# Patient Record
Sex: MALE | URBAN - METROPOLITAN AREA
[De-identification: names, ages, dates, MRNs, and addresses within clinical notes are randomized per-mention and may not be internally consistent; named-entity substitution may affect disease eponyms.]

---

## 2017-08-03 ENCOUNTER — RECORDS - HEALTHEAST (OUTPATIENT)
Dept: LAB | Facility: HOSPITAL | Age: 22
End: 2017-08-03

## 2017-08-04 LAB — HBV SURFACE AB SERPL IA-ACNC: NEGATIVE M[IU]/ML

## 2018-07-24 ENCOUNTER — COMMUNICATION - HEALTHEAST (OUTPATIENT)
Dept: TELEHEALTH | Facility: CLINIC | Age: 23
End: 2018-07-24

## 2018-07-24 ENCOUNTER — OFFICE VISIT - HEALTHEAST (OUTPATIENT)
Dept: FAMILY MEDICINE | Facility: CLINIC | Age: 23
End: 2018-07-24

## 2018-07-24 DIAGNOSIS — R05.9 COUGH: ICD-10-CM

## 2018-07-24 RX ORDER — ALBUTEROL SULFATE 90 UG/1
2 AEROSOL, METERED RESPIRATORY (INHALATION) EVERY 6 HOURS PRN
Qty: 1 EACH | Refills: 0 | Status: SHIPPED | OUTPATIENT
Start: 2018-07-24 | End: 2022-08-30

## 2018-07-24 RX ORDER — AZITHROMYCIN 250 MG/1
TABLET, FILM COATED ORAL
Qty: 6 TABLET | Refills: 0 | Status: SHIPPED | OUTPATIENT
Start: 2018-07-24 | End: 2021-09-26

## 2018-07-24 RX ORDER — BENZONATATE 200 MG/1
200 CAPSULE ORAL 3 TIMES DAILY PRN
Qty: 30 CAPSULE | Refills: 0 | Status: SHIPPED | OUTPATIENT
Start: 2018-07-24 | End: 2021-09-26

## 2021-06-01 VITALS — WEIGHT: 185 LBS

## 2021-06-26 NOTE — PROGRESS NOTES
Progress Notes by Fer Galvan PA-C at 7/24/2018 11:40 AM     Author: Fer Galvan PA-C Service: -- Author Type: Physician Assistant    Filed: 7/24/2018 12:27 PM Encounter Date: 7/24/2018 Status: Signed    : Fer Galvan PA-C (Physician Assistant)       Subjective:      Patient ID: Sudhir Mueller is a 23 y.o. male.    Chief Complaint:    HPI  Sudhir Mueller is a 23 y.o. male who presents today complaining of cold-like symptoms to include headache head congestion rhinorrhea dry nonproductive cough and congestion.  He has had also fatigue.  He is now developed sore throat odynophagia.  Has not had fever chills or night sweats but his temp in the office is 99.5 without antipyretic.  He denies vomiting diarrhea skin rash or urinary symptoms.    He works in housekeeping at [employer].  He is requesting a work note since he has been ill and does not want to return to work while he is sick.     No past medical history on file.    No past surgical history on file.    No family history on file.    Social History   Substance Use Topics   ? Smoking status: Never Smoker   ? Smokeless tobacco: Never Used   ? Alcohol use None       Review of Systems  As above in HPI, otherwise negative.    Objective:     /84 (Patient Site: Right Arm, Patient Position: Sitting, Cuff Size: Adult Regular)  Pulse 90  Temp 99  F (37.2  C) (Oral)   Resp 20  Wt 185 lb (83.9 kg)  SpO2 97%    Physical Exam  General: Patient is resting comfortably no acute distress is afebrile  HEENT: Head is normocephalic atraumatic eyes are PERRL EOMI sclera anicteric TMs are clear bilaterally  Throat is with mild pharyngeal wall erythema and tonsillar hypertrophy tonsils are 3+ uvula still midline posterior pharyngeal walls with heavy postnasal drainage  He has positive cervical lymphadenopathy and tenderness to palpation  Lungs: Clear to auscultation bilaterally  Heart: Regular rate and rhythm  Skin: Without rash  non-diaphoretic      Assessment:     Procedures    The encounter diagnosis was Cough.    Plan:     1. Cough  azithromycin (ZITHROMAX Z-JESSIE) 250 MG tablet    albuterol (PROAIR HFA;PROVENTIL HFA;VENTOLIN HFA) 90 mcg/actuation inhaler    benzonatate (TESSALON) 200 MG capsule         Patient Instructions     You were seen today for acute bronchitis. This is likely due to a viral illness.    Symptom management:  - Get plenty of rest  - Avoid smoking and second hand smoke  - May take tylenol or ibuprofen for fever/discomfort  - Drink plenty of non-caffeinated fluids  - Use nasal steroid spray for sinus congestion  - Albuterol inhaler may be used every 6 hours as needed for chest tightness      Reasons to be seen in the emergency room:  - Develop a fever of 100.4 or higher  - Cough changes, coughing up blood, or become short of breath  - Neck stiffness  - Chest pain  - Severe headache  - Unable to tolerate eating or drinking fluids    Otherwise, if no symptom improvement after 5 days, follow-up with your primary care provider.    As a result of our visit today, here are the action plans for you:    1. Medication(s) to stop: There are no discontinued medications.    2. Medication(s) to start or change:   Medications Ordered   Medications   ? albuterol (PROAIR HFA;PROVENTIL HFA;VENTOLIN HFA) 90 mcg/actuation inhaler     Sig: Inhale 2 puffs every 6 (six) hours as needed for wheezing. Use with spacer.     Dispense:  1 each     Refill:  0     May substitute the equivalent medication per insurance preference.   ? azithromycin (ZITHROMAX Z-JESSIE) 250 MG tablet     Sig: Take 2 tablets (500 mg) on  Day 1,  followed by 1 tablet (250 mg) once daily on Days 2 through 5.     Dispense:  6 tablet     Refill:  0   ? benzonatate (TESSALON) 200 MG capsule     Sig: Take 1 capsule (200 mg total) by mouth 3 (three) times a day as needed.     Dispense:  30 capsule     Refill:  0       3. Other instructions: Yes         Bronchitis, Antibiotic  Treatment (Adult)    Bronchitis is an infection of the air passages (bronchial tubes) in your lungs. It often occurs when you have a cold. This illness is contagious during the first few days and is spread through the air by coughing and sneezing, or by direct contact (touching the sick person and then touching your own eyes, nose, or mouth).  Symptoms of bronchitis include cough with mucus (phlegm) and low-grade fever. Bronchitis usually lasts 7 to 14 days. Mild cases can be treated with simple home remedies. More severe infection is treated with an antibiotic.  Home care  Follow these guidelines when caring for yourself at home:    If your symptoms are severe, rest at home for the first 2 to 3 days. When you go back to your usual activities, don't let yourself get too tired.    Do not smoke. Also avoid being exposed to secondhand smoke.    You may use over-the-counter medicines to control fever or pain, unless another medicine was prescribed. If you have chronic liver or kidney disease or have ever had a stomach ulcer or gastrointestinal bleeding, talk with your healthcare provider before using these medicines. Also talk to your provider if you are taking medicine to prevent blood clots. Aspirin should never be given to anyone younger than 18 years of age who is ill with a viral infection or fever. It may cause severe liver or brain damage.    Your appetite may be poor, so a light diet is fine. Avoid dehydration by drinking 6 to 8 glasses of fluids per day (such as water, soft drinks, sports drinks, juices, tea, or soup). Extra fluids will help loosen secretions in the nose and lungs.    Over-the-counter cough, cold, and sore-throat medicines will not shorten the length of the illness, but they may be helpful to reduce symptoms. (Note: Do not use decongestants if you have high blood pressure.)    Finish all antibiotic medicine. Do this even if you are feeling better after only a few days.  Follow-up care  Follow  up with your healthcare provider, or as advised. If you had an X-ray or ECG (electrocardiogram), a specialist will review it. You will be notified of any new findings that may affect your care.  If you are age 65 or older, or if you have a chronic lung disease or condition that affects your immune system, or you smoke, ask your healthcare provider about getting a pneumococcal vaccine and a yearly flu shot (influenza vaccine).  When to seek medical advice  Call your healthcare provider right away if any of these occur:    Fever of 100.4 F (38 C) or higher, or as directed by your healthcare provider    Coughing up increased amounts of colored sputum    Weakness, drowsiness, headache, facial pain, ear pain, or a stiff neck  Call 911  Call 911 if any of these occur.    Coughing up blood    Worsening weakness, drowsiness, headache, or stiff neck    Trouble breathing, wheezing, or pain with breathing  Date Last Reviewed: 9/13/2015 2000-2017 [employer]. [address] [place] [address], [place] [address]. All rights reserved. This information is not intended as a substitute for professional medical care. Always follow your healthcare professional's instructions.

## 2021-09-23 ENCOUNTER — OFFICE VISIT (OUTPATIENT)
Dept: FAMILY MEDICINE | Facility: CLINIC | Age: 26
End: 2021-09-23
Payer: COMMERCIAL

## 2021-09-23 VITALS
HEIGHT: 62 IN | SYSTOLIC BLOOD PRESSURE: 131 MMHG | BODY MASS INDEX: 45.27 KG/M2 | TEMPERATURE: 98.4 F | DIASTOLIC BLOOD PRESSURE: 86 MMHG | WEIGHT: 246 LBS | RESPIRATION RATE: 24 BRPM | HEART RATE: 96 BPM | OXYGEN SATURATION: 100 %

## 2021-09-23 DIAGNOSIS — Z71.85 VACCINE COUNSELING: Primary | ICD-10-CM

## 2021-09-23 DIAGNOSIS — R63.5 WEIGHT GAIN: ICD-10-CM

## 2021-09-23 DIAGNOSIS — Z00.00 ROUTINE GENERAL MEDICAL EXAMINATION AT A HEALTH CARE FACILITY: ICD-10-CM

## 2021-09-23 DIAGNOSIS — Z23 HIGH PRIORITY FOR 2019-NCOV VACCINE: ICD-10-CM

## 2021-09-23 DIAGNOSIS — E11.9 TYPE 2 DIABETES MELLITUS WITHOUT COMPLICATION, WITHOUT LONG-TERM CURRENT USE OF INSULIN (H): ICD-10-CM

## 2021-09-23 DIAGNOSIS — F39 MOOD DISORDER (H): ICD-10-CM

## 2021-09-23 DIAGNOSIS — E78.5 DYSLIPIDEMIA: ICD-10-CM

## 2021-09-23 DIAGNOSIS — R35.1 NOCTURIA: ICD-10-CM

## 2021-09-23 DIAGNOSIS — G44.209 TENSION HEADACHE: ICD-10-CM

## 2021-09-23 DIAGNOSIS — G47.9 SLEEP DISORDER: ICD-10-CM

## 2021-09-23 DIAGNOSIS — E66.813 CLASS 3 SEVERE OBESITY WITH SERIOUS COMORBIDITY AND BODY MASS INDEX (BMI) OF 45.0 TO 49.9 IN ADULT, UNSPECIFIED OBESITY TYPE (H): ICD-10-CM

## 2021-09-23 DIAGNOSIS — E66.01 CLASS 3 SEVERE OBESITY WITH SERIOUS COMORBIDITY AND BODY MASS INDEX (BMI) OF 45.0 TO 49.9 IN ADULT, UNSPECIFIED OBESITY TYPE (H): ICD-10-CM

## 2021-09-23 DIAGNOSIS — Z23 NEED FOR PROPHYLACTIC VACCINATION AND INOCULATION AGAINST INFLUENZA: ICD-10-CM

## 2021-09-23 LAB
CHOLEST SERPL-MCNC: 288 MG/DL
FASTING STATUS PATIENT QL REPORTED: YES
HBA1C MFR BLD: 11.7 % (ref 0–5.6)
HDLC SERPL-MCNC: 46 MG/DL
LDLC SERPL CALC-MCNC: 204 MG/DL
TRIGL SERPL-MCNC: 190 MG/DL

## 2021-09-23 PROCEDURE — 80061 LIPID PANEL: CPT | Performed by: STUDENT IN AN ORGANIZED HEALTH CARE EDUCATION/TRAINING PROGRAM

## 2021-09-23 PROCEDURE — 90686 IIV4 VACC NO PRSV 0.5 ML IM: CPT | Performed by: STUDENT IN AN ORGANIZED HEALTH CARE EDUCATION/TRAINING PROGRAM

## 2021-09-23 PROCEDURE — 99385 PREV VISIT NEW AGE 18-39: CPT | Mod: 25 | Performed by: STUDENT IN AN ORGANIZED HEALTH CARE EDUCATION/TRAINING PROGRAM

## 2021-09-23 PROCEDURE — 90651 9VHPV VACCINE 2/3 DOSE IM: CPT | Performed by: STUDENT IN AN ORGANIZED HEALTH CARE EDUCATION/TRAINING PROGRAM

## 2021-09-23 PROCEDURE — 90472 IMMUNIZATION ADMIN EACH ADD: CPT | Performed by: STUDENT IN AN ORGANIZED HEALTH CARE EDUCATION/TRAINING PROGRAM

## 2021-09-23 PROCEDURE — 0002A COVID-19,PF,PFIZER (12+ YRS): CPT | Performed by: STUDENT IN AN ORGANIZED HEALTH CARE EDUCATION/TRAINING PROGRAM

## 2021-09-23 PROCEDURE — 90471 IMMUNIZATION ADMIN: CPT | Performed by: STUDENT IN AN ORGANIZED HEALTH CARE EDUCATION/TRAINING PROGRAM

## 2021-09-23 PROCEDURE — 83036 HEMOGLOBIN GLYCOSYLATED A1C: CPT | Performed by: STUDENT IN AN ORGANIZED HEALTH CARE EDUCATION/TRAINING PROGRAM

## 2021-09-23 PROCEDURE — 91300 COVID-19,PF,PFIZER (12+ YRS): CPT | Performed by: STUDENT IN AN ORGANIZED HEALTH CARE EDUCATION/TRAINING PROGRAM

## 2021-09-23 PROCEDURE — 90715 TDAP VACCINE 7 YRS/> IM: CPT | Performed by: STUDENT IN AN ORGANIZED HEALTH CARE EDUCATION/TRAINING PROGRAM

## 2021-09-23 PROCEDURE — 36415 COLL VENOUS BLD VENIPUNCTURE: CPT | Performed by: STUDENT IN AN ORGANIZED HEALTH CARE EDUCATION/TRAINING PROGRAM

## 2021-09-23 ASSESSMENT — PATIENT HEALTH QUESTIONNAIRE - PHQ9: SUM OF ALL RESPONSES TO PHQ QUESTIONS 1-9: 16

## 2021-09-23 ASSESSMENT — MIFFLIN-ST. JEOR: SCORE: 1972.72

## 2021-09-26 PROBLEM — E11.9 TYPE 2 DIABETES MELLITUS WITHOUT COMPLICATION, WITHOUT LONG-TERM CURRENT USE OF INSULIN (H): Status: ACTIVE | Noted: 2021-09-26

## 2021-09-26 PROBLEM — E78.5 DYSLIPIDEMIA: Status: ACTIVE | Noted: 2021-09-26

## 2021-09-26 PROBLEM — F39 MOOD DISORDER (H): Status: ACTIVE | Noted: 2021-09-26

## 2021-09-26 PROBLEM — E66.813 CLASS 3 SEVERE OBESITY WITH SERIOUS COMORBIDITY AND BODY MASS INDEX (BMI) OF 45.0 TO 49.9 IN ADULT, UNSPECIFIED OBESITY TYPE (H): Status: ACTIVE | Noted: 2021-09-23

## 2021-09-26 PROBLEM — G44.209 TENSION HEADACHE: Status: ACTIVE | Noted: 2021-09-26

## 2021-09-26 PROBLEM — R63.5 WEIGHT GAIN: Status: ACTIVE | Noted: 2021-09-26

## 2021-09-26 RX ORDER — IBUPROFEN 800 MG/1
800 TABLET, FILM COATED ORAL
COMMUNITY
Start: 2021-04-22 | End: 2021-11-23

## 2021-09-26 NOTE — RESULT ENCOUNTER NOTE
Team,     I called Sudhir multiple times to discuss test results.   Would you please call him to find a good time for me to call him?   I prefer to discuss these results with him.     Thanks.     Briefly, for my/our records (please don't discuss this with him over the phone):   - Lipids poor control -  (goal <70). Likely related to weight. Rec: weight loss/diet/lifestyle changes.   - A1c 11.7, consistent with uncontrolled diabetes. Likely cause of nocturia. Lifestyle recommendations as outlined. Warrants repeat visit to discuss diagnosis and other screening (microalbumin, etc). Warrants pharm management beyond lifestyle - recommend metformin + GLP1 vs insulin (favor GLP for weight loss effect).     Dino Edmond DO   [facility] Resident   Pager#[phone]

## 2021-09-28 NOTE — PROGRESS NOTES
I have personally reviewed the history and examination as documented by Dr. Edmond.  I was present during key portions of the visit and agree with the assessment and plan as documented for 26 yr old male w/ morbid obesity here for wellness visit. Eval concerning for sleep apnea. Referral for sleep study. Check labwork for nocturia, obesity. Age-appropriate health maintenance and anticipatory guidance given.     Aashish Almazan MD  September 28, 2021  10:18 AM

## 2021-09-29 ENCOUNTER — TELEPHONE (OUTPATIENT)
Dept: FAMILY MEDICINE | Facility: CLINIC | Age: 26
End: 2021-09-29

## 2021-09-29 NOTE — TELEPHONE ENCOUNTER
Pt called back in after receiving missed call from us. I informed him that  would like to speak to him directly in regards to his lab results. Pt stated  can give him a call tomorrow 9/30 any time from 11am-3pm. End of call

## 2021-09-30 NOTE — TELEPHONE ENCOUNTER
Pt called in this morning to advise he has some free time now to talk to  but I informed him  will be occupied at the hospital. Pt stated he is free from 2PM-3PM today. Otherwise, he has free time tomorrow Fri 10/1 before 11am. I told him I will let  and his team know. End of call.

## 2021-10-06 NOTE — TELEPHONE ENCOUNTER
Pt called back because he never received a call from Dr Edmond. He wanted to check in and know what the results are.  It has been a week since he last called.

## 2021-10-08 ENCOUNTER — DOCUMENTATION ONLY (OUTPATIENT)
Dept: FAMILY MEDICINE | Facility: CLINIC | Age: 26
End: 2021-10-08

## 2021-10-08 NOTE — PROGRESS NOTES
10/08/21    Finally connected with Sudhir regarding lab results and new diagnosis of diabetes. Briefly explained A1c, and T1 vs T2. Discussed we do not know for sure which one he has but my suspicion is T2 given his weight (and not presenting in DKA, etc) - though he is fairly young and could have T1 from that perspective.     Discussed need to get him back into clinic and he will call this morning to set up an appointment. I offered to have staff call him but he preferred to call clinic himself now.     Discussed sx to be aware of and warning signs to call clinic / present to ED.   Discussed initial lifestyle changes. He will try to cut down on pop, energy drinks, sugar. Will try to exercise a bit more - I discussed 150 min of mod int exer per week.   Briefly mentioned medications but he did not want to discuss this in detail - will focus at next visit.     Dino Edmond DO   [facility] Resident   Pager#[phone]

## 2021-11-08 LAB — RETINOPATHY: NORMAL

## 2021-11-09 ENCOUNTER — OFFICE VISIT (OUTPATIENT)
Dept: FAMILY MEDICINE | Facility: CLINIC | Age: 26
End: 2021-11-09
Payer: COMMERCIAL

## 2021-11-09 VITALS
TEMPERATURE: 98.3 F | HEART RATE: 101 BPM | WEIGHT: 250.75 LBS | OXYGEN SATURATION: 96 % | BODY MASS INDEX: 46.56 KG/M2 | RESPIRATION RATE: 20 BRPM | SYSTOLIC BLOOD PRESSURE: 142 MMHG | DIASTOLIC BLOOD PRESSURE: 93 MMHG

## 2021-11-09 DIAGNOSIS — E78.5 DYSLIPIDEMIA ASSOCIATED WITH TYPE 2 DIABETES MELLITUS (H): ICD-10-CM

## 2021-11-09 DIAGNOSIS — G47.9 SLEEP DISORDER: ICD-10-CM

## 2021-11-09 DIAGNOSIS — E66.813 CLASS 3 SEVERE OBESITY WITH SERIOUS COMORBIDITY AND BODY MASS INDEX (BMI) OF 45.0 TO 49.9 IN ADULT, UNSPECIFIED OBESITY TYPE (H): ICD-10-CM

## 2021-11-09 DIAGNOSIS — R03.0 ELEVATED BLOOD PRESSURE READING WITHOUT DIAGNOSIS OF HYPERTENSION: ICD-10-CM

## 2021-11-09 DIAGNOSIS — G44.209 TENSION HEADACHE: ICD-10-CM

## 2021-11-09 DIAGNOSIS — E66.01 CLASS 3 SEVERE OBESITY WITH SERIOUS COMORBIDITY AND BODY MASS INDEX (BMI) OF 45.0 TO 49.9 IN ADULT, UNSPECIFIED OBESITY TYPE (H): ICD-10-CM

## 2021-11-09 DIAGNOSIS — E11.9 TYPE 2 DIABETES MELLITUS WITHOUT COMPLICATION, WITHOUT LONG-TERM CURRENT USE OF INSULIN (H): Primary | ICD-10-CM

## 2021-11-09 DIAGNOSIS — E11.69 DYSLIPIDEMIA ASSOCIATED WITH TYPE 2 DIABETES MELLITUS (H): ICD-10-CM

## 2021-11-09 LAB
ALBUMIN SERPL-MCNC: 3.7 G/DL (ref 3.4–5)
ALP SERPL-CCNC: 66 U/L (ref 40–150)
ALT SERPL W P-5'-P-CCNC: 278 U/L
ANION GAP SERPL CALCULATED.3IONS-SCNC: 15 MMOL/L (ref 3–14)
AST SERPL W P-5'-P-CCNC: 100 U/L (ref 0–45)
BILIRUB SERPL-MCNC: 0.9 MG/DL (ref 0.2–1.3)
BUN SERPL-MCNC: 10 MG/DL (ref 7–30)
CALCIUM SERPL-MCNC: 9.7 MG/DL (ref 8.5–10.1)
CHLORIDE BLD-SCNC: 98 MMOL/L
CO2 SERPL-SCNC: 28 MMOL/L (ref 20–32)
CREAT SERPL-MCNC: 0.6 MG/DL
CREAT UR-MCNC: 175 MG/DL
GFR SERPL CREATININE-BSD FRML MDRD: >90 ML/MIN/1.73M2
GLUCOSE BLD-MCNC: 222 MG/DL (ref 70–99)
MICROALBUMIN UR-MCNC: 84.74 MG/DL (ref 0–1.99)
MICROALBUMIN/CREAT UR: 484.2 MG/G CR
POTASSIUM BLD-SCNC: 4.2 MMOL/L (ref 3.4–5.3)
PROT SERPL-MCNC: 7.8 G/DL (ref 6.8–8.8)
SODIUM SERPL-SCNC: 141 MMOL/L (ref 133–144)

## 2021-11-09 PROCEDURE — 36415 COLL VENOUS BLD VENIPUNCTURE: CPT | Performed by: STUDENT IN AN ORGANIZED HEALTH CARE EDUCATION/TRAINING PROGRAM

## 2021-11-09 PROCEDURE — 99214 OFFICE O/P EST MOD 30 MIN: CPT | Mod: 25 | Performed by: STUDENT IN AN ORGANIZED HEALTH CARE EDUCATION/TRAINING PROGRAM

## 2021-11-09 PROCEDURE — 80053 COMPREHEN METABOLIC PANEL: CPT | Performed by: STUDENT IN AN ORGANIZED HEALTH CARE EDUCATION/TRAINING PROGRAM

## 2021-11-09 PROCEDURE — 82043 UR ALBUMIN QUANTITATIVE: CPT | Performed by: STUDENT IN AN ORGANIZED HEALTH CARE EDUCATION/TRAINING PROGRAM

## 2021-11-09 NOTE — PROGRESS NOTES
Assessment & Plan     (E11.9) Type 2 diabetes mellitus without complication, without long-term current use of insulin (H)  (primary encounter diagnosis)  (E11.69,  E78.5) Dyslipidemia associated with type 2 diabetes mellitus (H)  Comment: A1c 11.7 as of 9/23/21 - new diagnosis of diabetes. Goal is <8% per D5 and 7% given age / ADA. Presume T2DM given weight and happened to catch on screening rather than DKA or other more pressing presentation. Discussed we could possibly send T1 blood-work ((GAD65), (IA2), insulin, and/or zinc transporter 8 (ZnT8)) but he is in favor of functionally treating as T2. Discussed starting insulin (likely medically recommended) vs metformin plus other agents (ie GLP1, etc) -- possibly more patient-centered. He weighed the options and decided to start with metformin plus GLP1, as it employs less needles (he is needle-phobic) and has more potential for weight loss, rather than weight gain with insulin. Discussed that from a pure [id number] perspective, insulin would likely be our best option but he is more willing to do the other. I feel this is reasonable as this is his first attempt and we can always adjust later on. Theoretically, could aim for around a 5% A1c lowering with this regimen (just around 7%).   The five goals of the D5 include:  1. Control blood pressure goal <140/90 given age and diagnosis. Discussed below.   2. Lower bad cholesterol.  on 9/23. Statin indicated given LDL>190. Patient declined today. Will continue to offer. Otherwise, Dietary advice as mentioned.   3. Maintain blood sugar -- getting BG equipment and instructions on how to check. Diet, exercise, and meds   4. Be tobacco-free - Yes.   5. Take aspirin as recommended. Not indicated - ASA intentionally not prescribed.   Plan:  Discussed initial lifestyle changes   He will try to cut down on pop, energy drinks, sugar.    Brown over white rice    Will try to exercise a bit more - I discussed 150 min of  moderate intensity exercise per week    Hopefully he can exercise more with his new job    Hoping he can meet with PharmD / diabetes educator to further discuss   Labs today    Comprehensive metabolic panel   Albumin Random Urine Quantitative with Creat Ratio  Meds as above    metFORMIN (GLUCOPHAGE) 500 MG tablet; Take 1 tablet (500 mg) by mouth 2 times daily (with meals) Start by taking one tablet once daily with meals. After 1 week, build to 1 tablet  in the morning and 1 tablet in the evening daily.  Dispense: 30 tablet; Refill: 1   semaglutide (OZEMPIC) 2 MG/1.5ML SOPN pen; Inject 0.25 mg Subcutaneous every 7 days  Dispense: 1.5 mL; Refill: 0  Eventual dosing instructions with semaglutide    Initial: 0.25 mg once weekly for 4 weeks   then increase to 0.5 mg once weekly; may increase to 1 mg once weekly after an additional 4 weeks if needed to achieve glycemic goals.    Note: The lower initial dose (0.25 mg weekly) is intended to reduce GI symptoms; it does not provide effective glycemic control. If changing the day of administration is necessary, allow at least 48 hours between 2 doses.  If we end up going the insulin route, consider: ~110kg x 0.2 = 22 unit(s). So 11 lantus (then consider 3-4 per meal)  Blood glucose testing equipment    blood glucose (NO BRAND SPECIFIED) lancets standard; Use to test blood sugar 1-2 times daily or as directed.  Dispense: 100 each; Refill: 4   blood glucose monitoring (NO BRAND SPECIFIED) meter device kit; Use to test blood sugar 1-2 times daily or as directed.  Dispense: 1 kit; Refill: 1   blood glucose (NO BRAND SPECIFIED) test strip; Use to test blood sugar 1-2 times daily or as directed.  Dispense: 100 strip; Refill: 4   blood glucose calibration (ONETOUCH VERIO) solution; Use to calibrate blood glucose monitor as needed as directed.  Dispense: 100 each; Refill: 3     (R03.0) Elevated blood pressure reading without diagnosis of hypertension  Comment: Above goal today;  ">140/90. Noted.  Plan:   - Monitor.   - Discuss further at next visit.   - Likely consider ACE/ARB, especially if proteinuria     (E66.01,  Z68.42) Class 3 severe obesity with serious comorbidity and body mass index (BMI) of 45.0 to 49.9 in adult, unspecified obesity type (H)  Comment: Weight essentially stable since September (up 2 lbs). Praised this piece. Trying to prioritize weight loss in discussion above. I feel he will ultimately need to be plugged in to weight clinic for best results.   Plan:   As above: semaglutide (OZEMPIC) 2 MG/1.5ML SOPN pen  Continue to offer weight clinic referral    He is hesitant because he doesn't want surgery, which I understand    I am trying to communicate that there are additional medical options    Also more dietary / psych support     (G47.9) Sleep disorder  Comment: He has been contacted by sleep study group. Still planning on doing it.   Plan:   There may be a form I need to sign, I am happy to do so     (G44.209) Tension headache  Comment: Improved.   Plan:   Monitor.   Pain may return - reassess at that time        Jeanine Edmond DO [facility]    Precepted with Dr. Britta Alvarez   Sudhir is a 26 year old with hx of BMI >45 who presents for the following health issues:    HPI     Diabetes (new diagnosis):  Stemming from our initial visit 9/23/21:  \"A1c 11.7, consistent with uncontrolled diabetes. Likely cause of nocturia. Lifestyle recommendations as outlined. Warrants repeat visit to discuss diagnosis and other screening (microalbumin, etc). Warrants pharm management beyond lifestyle - recommend metformin + GLP1 vs insulin (favor GLP for weight loss effect).\"     Sx:    - Some left knee pain   - No numbness   - Had excessive nocturia - now much less after changing his diet   Do you have any of these symptoms? (None of the following apply to Sudhir) Numbness in feet, Burning in feet, Redness, sores, or blisters on " feet  Excessive thirst, Blurry vision, No numbness or tingling in feet.  No redness, sores or blisters on feet.  No complaints of excessive thirst.  No reports of blurry vision.      Family hx of diabetes? None     Diet:  Watching sugar since he found out about the diagnosis   Cutting back on rice - white to brown   Cutting back on junk food   Cutting down on alcohol     Exercise:   Has not exercised much yet but is switching to a new job   Will have an extra day off and plans to be able to exercise much more because of this (throughout the week)     Do you smoke?   - no     When was your last eye exam? recently - told he did not have any retina changes     Review of Systems   Constitutional, HEENT, cardiovascular, pulmonary, GI, , musculoskeletal, neuro, skin, endocrine and psych systems are negative, except as otherwise noted.      Objective    BP (!) 145/94 (BP Location: Right arm, Patient Position: Sitting, Cuff Size: Adult Regular)   Pulse 101   Temp 98.3  F (36.8  C) (Oral)   Resp 20   Wt 113.7 kg (250 lb 12 oz)   SpO2 96%   BMI 46.56 kg/m    Body mass index is 46.56 kg/m .  Physical Exam   GENERAL: alert and no distress. Markedly obese.   EYES: Eyes grossly normal to inspection, extraocular movements - intact.   NECK: increased neck circumference. No overt asymmetry.   RESP: no inc WOB.  CV: regular rates and rhythm, no murmur - Appears well-perfused.   ABDOMEN: markedly obese. soft, no tenderness, no masses, normal bowel sounds.   SKIN: no suspicious lesions, no rashes. No abdominal striae, bruising, buffalo hump.   NEURO: non-focal.   PSYCH: Alert and oriented times 3, normal mood and affect.   Diabetic foot exam: DP/PT pulses intact and symmetric. Sensation intact and symmetric throughout. No wounds or rashes.

## 2021-11-09 NOTE — PROGRESS NOTES
nPreceptor Attestation:   Patient seen, evaluated and discussed with the resident. I have verified the content of the note, which accurately reflects my assessment of the patient and the plan of care.  Supervising Physician:Val Callejas MD  [facility]

## 2021-11-10 PROBLEM — E78.5 DYSLIPIDEMIA ASSOCIATED WITH TYPE 2 DIABETES MELLITUS (H): Status: ACTIVE | Noted: 2021-11-10

## 2021-11-10 PROBLEM — R03.0 ELEVATED BLOOD PRESSURE READING WITHOUT DIAGNOSIS OF HYPERTENSION: Status: ACTIVE | Noted: 2021-11-10

## 2021-11-10 PROBLEM — E11.69 DYSLIPIDEMIA ASSOCIATED WITH TYPE 2 DIABETES MELLITUS (H): Status: ACTIVE | Noted: 2021-11-10

## 2021-11-11 NOTE — PATIENT INSTRUCTIONS
We discussed lots of diet and exercise recommendations     I will send you websites that discuss diabetes     You decided to try   Metformin   Ozempic   I will put in orders     I will send in stuff to check your blood sugar     Hopefully we can connect you with nurses and our pharmacist to help with education and the practical nature of how to check blood sugar, give injectable meds, etc

## 2021-11-12 NOTE — RESULT ENCOUNTER NOTE
Discussed results over phone with Sudhir. He is feeling slightly overwhelmed with all of the changes related to the new diabetes diagnosis so he did not want to focus on these heavily today.    For our info going forward:  - Transaminases elevated at , . Suspect NAFLD. Will need: hepatitis B surface antigen and hepatitis C virus antibody. Likely ferritin/iron? As well as abdominal ultrasound.    - Clinical proteinuria with microalbumin 85 and albumin/Cr ratio of 484. Need to consider ACE/ARB.

## 2021-11-21 NOTE — PROGRESS NOTES
Assessment & Plan     (E11.29,  R80.9) Diabetes mellitus with proteinuria (H)  (primary encounter diagnosis)  Comment: See last note for complete diabetes update. Today is mainly a check-in to ensure he received equipment, medications. Follow up on labs as below. Noted to have clinical proteinuria with microalbumin 85 and albumin/Cr ratio of 484 on first routine check. Need to consider ACE/ARB; discussed risks and benefits and he is agreeable.   Plan:   - Reinforced lifestyle changes discussed at last visit and today   - Please check more consistent at least fasting AM sugars (ideal to have prandial values as well)   I discussed today    RN met today - appreciate   - Continue metformin/semaglutide as noted at last visit    RN discussed technique today, appreciate   - Start ACE today: lisinopril (ZESTRIL) 5 MG tablet; Take 1 tablet (5 mg) by mouth daily  Dispense: 30 tablet; Refill: 1  - F/u visit in 2-3 weeks for PharmD Co-visit with me     (R74.01) Transaminitis  Comment: , . Presumed NAFLD related to weight. Discussed workup to rule out other conditions. He is in agreement.   Plan:   - Hepatitis C antibody  - Hepatitis B Surface Ag  - Hepatitis B Surface Antibody  - Ferritin  - US ABDOMEN LIMITED    (E11.69,  E78.5) Dyslipidemia associated with type 2 diabetes mellitus (H)  Comment:  recent check. Would qualify for statin but will hold given transaminitis as mentioned elsewhere.   Plan:   - Workup transaminitis as elsewhere   - Consider statin on an ongoing basis - hold for now given transaminitis       (I10) Benign essential hypertension  Comment: Now multiple visits with BP above goal (>140/90). Assume related to weight / metabolic picture. Discussed lisinopril as noted elsewhere.   Plan:   - Lifestyle factors as mentioned elsewhere, encouraged low salt   - Start lisinopril today.    - Follow up visit in 2 weeks for BP recheck and BMP at minimum     (E66.01,  Z68.42) Class 3 severe obesity  "with serious comorbidity and body mass index (BMI) of 45.0 to 49.9 in adult, unspecified obesity type (H)  Comment: Weight down 2 lbs from last visit - stable.   BMI:   Estimated body mass index is 46.56 kg/m  as calculated from the following:    Height as of 9/23/21: 1.563 m (5' 1.54\").    Weight as of 11/9/21: 113.7 kg (250 lb 12 oz).   Plan:   - Lifestyle as above  - Semaglutide as above   - Not ready yet for weight clinic, continue to offer     (R35.1) Nocturia  Comment: improved/Resolved. Anticipate this is/was related to diabetes.     (Z59.89) Insurance coverage problems  (Z56.1) Change of job  Comment: some uncertainties regarding medications in the setting of switching jobs.   Plan:   - SW spoke with him today, appreciate   - Notify me if there are further concerns     Jeanine Edmond DO [facility]    Precepted with Dr. Rosenstein       Antonio Peguero is a 26 year old with hx Obesity BMI >45, newly-diagnosed diabetes (presumed T2) found to have dyslipidemia and proteinuria who presents for the following health issues     HPI   Diabetes:   - Diet: cutting back on rice (eating \"portion control\"). Meal prep. Lot of greens (veggies).    - Exercise: now has a gym [employer] - trying to coordinate with him -- running some. Mostly walking. Doing some free weights.   - Started new job? Yes. Overnight mostly. 4 days.    - Sugars (got equipment?): not checking much. Checked once - had a value of 200 (after eating ice cream).    - Meds:    Metformin - taking inconsistently. Hard for him with    Semaglutide - taking weekly.    - Any symptom change: sleeping better. Headache gone. More energy. Nocturia has improved significantly. Otherwise denies vision change, extremity numbness/weakness, chest pain, shortness of breath, palpitations, etc.     From last visit:   (E11.9) Type 2 diabetes mellitus without complication, without long-term current use of insulin (H)  (primary encounter " diagnosis)  (E11.69,  E78.5) Dyslipidemia associated with type 2 diabetes mellitus (H)  Comment: A1c 11.7 as of 9/23/21 - new diagnosis of diabetes. Goal is <8% per D5 and 7% given age / ADA. Presume T2DM given weight and happened to catch on screening rather than DKA or other more pressing presentation. Discussed we could possibly send T1 blood-work ((GAD65), (IA2), insulin, and/or zinc transporter 8 (ZnT8)) but he is in favor of functionally treating as T2. Discussed starting insulin (likely medically recommended) vs metformin plus other agents (ie GLP1, etc) -- possibly more patient-centered. He weighed the options and decided to start with metformin plus GLP1, as it employs less needles (he is needle-phobic) and has more potential for weight loss, rather than weight gain with insulin. Discussed that from a pure [id number] perspective, insulin would likely be our best option but he is more willing to do the other. I feel this is reasonable as this is his first attempt and we can always adjust later on. Theoretically, could aim for around a 5% A1c lowering with this regimen (just around 7%).   The five goals of the D5 include:  1. Control blood pressure goal <140/90 given age and diagnosis. Discussed below.   2. Lower bad cholesterol.  on 9/23. Statin indicated given LDL>190. Patient declined today. Will continue to offer. Otherwise, Dietary advice as mentioned.   3. Maintain blood sugar -- getting BG equipment and instructions on how to check. Diet, exercise, and meds   4. Be tobacco-free - Yes.   5. Take aspirin as recommended. Not indicated - ASA intentionally not prescribed.   Plan:  Discussed initial lifestyle changes              He will try to cut down on pop, energy drinks, sugar.               Brown over white rice               Will try to exercise a bit more - I discussed 150 min of moderate intensity exercise per week               Hopefully he can exercise more with his new job                Hoping he can meet with PharmD / diabetes educator to further discuss   Labs today               Comprehensive metabolic panel              Albumin Random Urine Quantitative with Creat Ratio  Meds as above               metFORMIN (GLUCOPHAGE) 500 MG tablet; Take 1 tablet (500 mg) by mouth 2 times daily (with meals) Start by taking one tablet once daily with meals. After 1 week, build to 1 tablet           in the morning and 1 tablet in the evening daily.  Dispense: 30 tablet; Refill: 1              semaglutide (OZEMPIC) 2 MG/1.5ML SOPN pen; Inject 0.25 mg Subcutaneous every 7 days  Dispense: 1.5 mL; Refill: 0  Eventual dosing instructions with semaglutide               Initial: 0.25 mg once weekly for 4 weeks              then increase to 0.5 mg once weekly; may increase to 1 mg once weekly after an additional 4 weeks if needed to achieve glycemic goals.               Note: The lower initial dose (0.25 mg weekly) is intended to reduce GI symptoms; it does not provide effective glycemic control. If changing the day of administration is necessary, allow at least 48 hours between 2 doses.  If we end up going the insulin route, consider: ~110kg x 0.2 = 22 unit(s). So 11 lantus (then consider 3-4 per meal)  Blood glucose testing equipment               blood glucose (NO BRAND SPECIFIED) lancets standard; Use to test blood sugar 1-2 times daily or as directed.  Dispense: 100 each; Refill: 4              blood glucose monitoring (NO BRAND SPECIFIED) meter device kit; Use to test blood sugar 1-2 times daily or as directed.  Dispense: 1 kit; Refill: 1              blood glucose (NO BRAND SPECIFIED) test strip; Use to test blood sugar 1-2 times daily or as directed.  Dispense: 100 strip; Refill: 4              blood glucose calibration (ONETOUCH VERIO) solution; Use to calibrate blood glucose monitor as needed as directed.  Dispense: 100 each; Refill: 3     (R03.0) Elevated blood pressure reading without diagnosis of  hypertension  Comment: Above goal today; >140/90. Noted.  Plan:   - Monitor.   - Discuss further at next visit.   - Likely consider ACE/ARB, especially if proteinuria      (E66.01,  Z68.42) Class 3 severe obesity with serious comorbidity and body mass index (BMI) of 45.0 to 49.9 in adult, unspecified obesity type (H)  Comment: Weight essentially stable since September (up 2 lbs). Praised this piece. Trying to prioritize weight loss in discussion above. I feel he will ultimately need to be plugged in to weight clinic for best results.   Plan:   As above: semaglutide (OZEMPIC) 2 MG/1.5ML SOPN pen  Continue to offer weight clinic referral               He is hesitant because he doesn't want surgery, which I understand               I am trying to communicate that there are additional medical options               Also more dietary / psych support      (G47.9) Sleep disorder  Comment: He has been contacted by sleep study group. Still planning on doing it.   Plan:   There may be a form I need to sign, I am happy to do so      (G44.209) Tension headache  Comment: Improved.   Plan:   Monitor.   Pain may return - reassess at that time     Review of Systems   Constitutional, HEENT, cardiovascular, pulmonary, gi and gu systems are negative, except as otherwise noted.      Objective    BP (!) 143/92 (BP Location: Right arm, Patient Position: Sitting, Cuff Size: Adult Regular)   Pulse 105   Temp 98.3  F (36.8  C) (Oral)   Resp 20   Wt 112.5 kg (248 lb)   SpO2 97%   BMI 46.05 kg/m    Body mass index is 46.05 kg/m .   Wt Readings from Last 4 Encounters:   11/23/21 112.5 kg (248 lb)   11/09/21 113.7 kg (250 lb 12 oz)   09/23/21 111.6 kg (246 lb)   07/24/18 83.9 kg (185 lb)     Physical Exam   GENERAL: alert and no distress. Markedly Obese.   EYES: Eyes grossly normal to inspection, conjunctivae and sclerae normal  RESP: breathing comfortably. lungs clear to auscultation - no rales, rhonchi or wheezes  CV: appropriately  perfused. regular rate and rhythm, normal S1 S2, no S3 or S4, no murmur, click or rub, no peripheral edema and peripheral pulses strong  ABDOMEN: obese. soft, nontender.  MS: no gross musculoskeletal defects noted, no edema  SKIN: acanthosis nigricans. Otherwise, no suspicious lesions or rashes  NEURO: Normal strength and tone, mentation intact and speech normal  PSYCH: mentation appears normal, affect normal/bright  Diabetic foot exam: normal DP and PT pulses, no trophic changes or ulcerative lesions and normal sensory exam

## 2021-11-23 ENCOUNTER — OFFICE VISIT (OUTPATIENT)
Dept: FAMILY MEDICINE | Facility: CLINIC | Age: 26
End: 2021-11-23
Payer: COMMERCIAL

## 2021-11-23 VITALS
RESPIRATION RATE: 20 BRPM | SYSTOLIC BLOOD PRESSURE: 144 MMHG | OXYGEN SATURATION: 97 % | HEART RATE: 105 BPM | BODY MASS INDEX: 46.05 KG/M2 | DIASTOLIC BLOOD PRESSURE: 93 MMHG | TEMPERATURE: 98.3 F | WEIGHT: 248 LBS

## 2021-11-23 DIAGNOSIS — E11.69 DYSLIPIDEMIA ASSOCIATED WITH TYPE 2 DIABETES MELLITUS (H): ICD-10-CM

## 2021-11-23 DIAGNOSIS — E66.01 CLASS 3 SEVERE OBESITY WITH SERIOUS COMORBIDITY AND BODY MASS INDEX (BMI) OF 45.0 TO 49.9 IN ADULT, UNSPECIFIED OBESITY TYPE (H): ICD-10-CM

## 2021-11-23 DIAGNOSIS — R74.01 TRANSAMINITIS: ICD-10-CM

## 2021-11-23 DIAGNOSIS — Z56.1 CHANGE OF JOB: ICD-10-CM

## 2021-11-23 DIAGNOSIS — E11.29 DIABETES MELLITUS WITH PROTEINURIA (H): Primary | ICD-10-CM

## 2021-11-23 DIAGNOSIS — Z59.71 INSURANCE COVERAGE PROBLEMS: ICD-10-CM

## 2021-11-23 DIAGNOSIS — R80.9 DIABETES MELLITUS WITH PROTEINURIA (H): Primary | ICD-10-CM

## 2021-11-23 DIAGNOSIS — E66.813 CLASS 3 SEVERE OBESITY WITH SERIOUS COMORBIDITY AND BODY MASS INDEX (BMI) OF 45.0 TO 49.9 IN ADULT, UNSPECIFIED OBESITY TYPE (H): ICD-10-CM

## 2021-11-23 DIAGNOSIS — R35.1 NOCTURIA: ICD-10-CM

## 2021-11-23 DIAGNOSIS — I10 BENIGN ESSENTIAL HYPERTENSION: ICD-10-CM

## 2021-11-23 DIAGNOSIS — E78.5 DYSLIPIDEMIA ASSOCIATED WITH TYPE 2 DIABETES MELLITUS (H): ICD-10-CM

## 2021-11-23 PROBLEM — E11.21 TYPE 2 DIABETES MELLITUS WITH DIABETIC NEPHROPATHY, WITHOUT LONG-TERM CURRENT USE OF INSULIN (H): Status: ACTIVE | Noted: 2021-11-10

## 2021-11-23 PROBLEM — R63.5 WEIGHT GAIN: Status: RESOLVED | Noted: 2021-09-26 | Resolved: 2021-11-23

## 2021-11-23 LAB — FERRITIN SERPL-MCNC: 717 NG/ML (ref 27–300)

## 2021-11-23 PROCEDURE — 86706 HEP B SURFACE ANTIBODY: CPT | Performed by: STUDENT IN AN ORGANIZED HEALTH CARE EDUCATION/TRAINING PROGRAM

## 2021-11-23 PROCEDURE — 87340 HEPATITIS B SURFACE AG IA: CPT | Performed by: STUDENT IN AN ORGANIZED HEALTH CARE EDUCATION/TRAINING PROGRAM

## 2021-11-23 PROCEDURE — 36415 COLL VENOUS BLD VENIPUNCTURE: CPT | Performed by: STUDENT IN AN ORGANIZED HEALTH CARE EDUCATION/TRAINING PROGRAM

## 2021-11-23 PROCEDURE — 86803 HEPATITIS C AB TEST: CPT | Performed by: STUDENT IN AN ORGANIZED HEALTH CARE EDUCATION/TRAINING PROGRAM

## 2021-11-23 PROCEDURE — 82728 ASSAY OF FERRITIN: CPT | Performed by: STUDENT IN AN ORGANIZED HEALTH CARE EDUCATION/TRAINING PROGRAM

## 2021-11-23 PROCEDURE — 99214 OFFICE O/P EST MOD 30 MIN: CPT | Mod: 25 | Performed by: STUDENT IN AN ORGANIZED HEALTH CARE EDUCATION/TRAINING PROGRAM

## 2021-11-23 RX ORDER — LISINOPRIL 5 MG/1
5 TABLET ORAL DAILY
Qty: 30 TABLET | Refills: 1 | Status: SHIPPED | OUTPATIENT
Start: 2021-11-23 | End: 2022-02-16

## 2021-11-23 SDOH — ECONOMIC STABILITY - INCOME SECURITY: CHANGE OF JOB: Z56.1

## 2021-11-23 NOTE — PROGRESS NOTES
Preceptor Attestation:   Patient seen, evaluated and discussed with the resident Dr. Edmond. I have verified the content of the note, which accurately reflects my assessment of the patient and the plan of care.    Relatively new diagnosis of diabetes (likely T2DM). Continue medication modification, ACEi soon, further work-up of suspected MAFLD.     Supervising Physician:Benjamin Rosenstein, MD, MA [facility]

## 2021-11-23 NOTE — NURSING NOTE
" requested this RN speak with Sudhir to discuss his diabetes and provide education. Sudhir voiced and demonstrated ability to check blood glucose along with injection his once weekly semaglutide. Sudhir did voice concern over checking his blood glucose on his finger, stating \"it is scary because I can see it\".     This RN provided education on using a different finger, using the side of the finger pad, allowing the arm to dangle if fingers are cold, and running hands under warm water prior to. This RN provided reassurance to the patient on fears and hesitation to checking blood glucose on his finger. This RN also provided education on checking bg 4 times daily and sticking to either one or two hour postprandial. Requested patient write down how long after eating the bg check is.     Sudhir asked this RN what he can do with his diet and exercise to bring his numbers down. This RN provided education on eating more frequent smaller meals, carbohydrate-conscious diet, exercise or brisk walk for 15 minutes after eating, maintaining adequate portion sizes with the use of his palm as a guide. This RN advised it is recommended he meet with PharmD in two to three weeks after being able to see his trending blood glucoses. At that appointment, PharmD can pinpoint specific changes he can make.     Sudhir stated he had no further questions and thanks this RN.    Ronal RN  "

## 2021-11-23 NOTE — PATIENT INSTRUCTIONS
Check a blood sugar when you wake up     Keep taking the meds! Our ultimate goal is 1000 mg twice daily on metformin.     Keep up the good lifestyle questions     For Insurance:  -Go to [email or web address] and create login, make sure you're selecting plans for December 2021. A job change is a qualifying life event to apply for insurance through [email or web address] [facility].  -[email or web address] [facility] has assisters that can be found on the website or at [facility] (David Talbert [phone]; Leela Scott [phone]).  -Call  Fan at [facility] if there are issues ([phone])

## 2021-11-23 NOTE — PROGRESS NOTES
11/23/2021    Reason for Visit: Patient insurance question.    Problem/Need Addressed: Patient employer-provided insurance change due to job change in month of November 2021.    Plan Based On Visit: Patient to apply for insurance for month of December through [facility]. Patient given information for [facility] assisters if needed.    Follow-Up Needed: Patient given  contact information if issues arise.    SYMONE RODRIGUEZ, LGSW, LADC

## 2021-11-24 LAB
HBV SURFACE AB SERPLBLD QL IA.RAPID: NEGATIVE
HBV SURFACE AG SERPL QL IA: NONREACTIVE
HCV AB SERPL QL IA: NEGATIVE

## 2021-12-01 DIAGNOSIS — R79.89 ELEVATED FERRITIN: Primary | ICD-10-CM

## 2021-12-01 NOTE — PROGRESS NOTES
12/01/21    Discussed elevated ferritin to 717 with Dr. Rosenstein. Most likely still consistent with NAFLD / metabolic and obesity / pro-inflammatory / chronic-disease state but need to consider factors such as hemochromatosis (though would expect ferritin at least above 1000 for this) vs HIV vs HLH vs malignancy, etc. Feel reassured not viral with negative Hep B/C. Next step: check a full iron panel + HIV.     Called patient to discuss above. He understands and consented to lab testing of the above.   He will try to get his blood work done tomorrow when he comes for his RUQ ultrasound.   Message to staff sent to assist.     Dino Edmond DO   [facility] Medicine Resident   Pager#[phone]

## 2021-12-02 ENCOUNTER — ANCILLARY PROCEDURE (OUTPATIENT)
Dept: ULTRASOUND IMAGING | Facility: CLINIC | Age: 26
End: 2021-12-02
Attending: FAMILY MEDICINE
Payer: COMMERCIAL

## 2021-12-02 ENCOUNTER — LAB (OUTPATIENT)
Dept: LAB | Facility: CLINIC | Age: 26
End: 2021-12-02

## 2021-12-02 DIAGNOSIS — R79.89 ELEVATED FERRITIN: ICD-10-CM

## 2021-12-02 DIAGNOSIS — R74.01 TRANSAMINITIS: ICD-10-CM

## 2021-12-02 LAB
FERRITIN SERPL-MCNC: 415 NG/ML (ref 27–300)
HIV 1+2 AB+HIV1 P24 AG SERPL QL IA: NEGATIVE
IRON SATN MFR SERPL: 19 % (ref 15–46)
IRON SERPL-MCNC: 56 UG/DL (ref 35–180)
TIBC SERPL-MCNC: 294 UG/DL (ref 240–430)

## 2021-12-02 PROCEDURE — 83550 IRON BINDING TEST: CPT

## 2021-12-02 PROCEDURE — 76705 ECHO EXAM OF ABDOMEN: CPT | Performed by: RADIOLOGY

## 2021-12-02 PROCEDURE — 82728 ASSAY OF FERRITIN: CPT

## 2021-12-02 PROCEDURE — 36415 COLL VENOUS BLD VENIPUNCTURE: CPT

## 2021-12-02 PROCEDURE — 87389 HIV-1 AG W/HIV-1&-2 AB AG IA: CPT

## 2021-12-22 ENCOUNTER — PATIENT OUTREACH (OUTPATIENT)
Dept: FAMILY MEDICINE | Facility: CLINIC | Age: 26
End: 2021-12-22

## 2021-12-22 ENCOUNTER — TELEPHONE (OUTPATIENT)
Dept: FAMILY MEDICINE | Facility: CLINIC | Age: 26
End: 2021-12-22

## 2021-12-22 ENCOUNTER — OFFICE VISIT (OUTPATIENT)
Dept: PHARMACY | Facility: CLINIC | Age: 26
End: 2021-12-22

## 2021-12-22 DIAGNOSIS — E11.9 TYPE 2 DIABETES MELLITUS WITHOUT COMPLICATION, WITHOUT LONG-TERM CURRENT USE OF INSULIN (H): Primary | ICD-10-CM

## 2021-12-22 PROCEDURE — 99207 PR NO CHARGE LOS: CPT | Performed by: PHARMACIST

## 2021-12-22 NOTE — PROGRESS NOTES
Clinic Care Coordination Contact    Follow Up Progress Note      Assessment: Patient reported having applied and selected a plan via [employer]. Patient reported getting an error message when logging into [employer] now, so patient to call [employer] this afternoon. SW inquired if patient had submitted verification of end of employment to [employer]; patient reported not having submitted this verification. SW provided patient Rx Savings information for medications from clinic pharmacist and recommended patient follow through with call to [employer] this date. Patient employer insurance begins 1/1/2022.    Care Gaps:    Health Maintenance Due   Topic Date Due     ADVANCE CARE PLANNING  Never done     Pneumococcal Vaccine: Pediatrics (0 to 5 Years) and At-Risk Patients (6 to 64 Years) (1 of 2 - PPSV23) Never done     A1C  12/23/2021       Care Gaps: Patient currently lacks intermittent health insurance in employer switch.     Goals addressed this encounter:   Goals Addressed    None         Intervention/Education provided during outreach: SW explained [employer] process and explained Rx savings information this date.     Plan:   Patient to obtain cheaper medications and follow dosing instructions outlined by pharmacist this date. Patient to call [employer] this date.    Care Coordinator will follow up at next patient appointment.    SYMONE RODRIGUEZ, SURAJ, LADC

## 2021-12-22 NOTE — PROGRESS NOTES
Clinical Pharmacy Consult:                                                    Sudhir Mueller is a 26 year old male coming in for a clinical pharmacist consult.  He was referred to me from Dr. Edmond.     Reason for Consult: Lack of insurance currently, wondering how to obtain refills of medicines.     Discussion: Patient has one dose of Ozempic left (taking every Thursday), taking 1 tablet of Metformin daily, taking 1 tablet of Lisinopril. Sometime on non work days forgets to take medicines. Denies concerns for ongoing side effects, initially had stomach upset, now resolved.     Plan:  1. Provided [employer] coupons for Metformin and Lisinopril  2. Met with SW regarding insurance status  3. Counseled to increase to 2 tablets/day of Metformin     Sakina Cordero, PharmD, [facility] (previously, CDE)  [facility]  Phone: [phone]  December 22, 2021 at 4:47 PM

## 2021-12-22 NOTE — PATIENT INSTRUCTIONS
Recommendations from today's visit:                                                      1. Today we talked about increasing your Metformin from 1 pill per day to 2 pills per day. You can take both of these pills at the same time  2. Today we talked about using some kind of reminder tool to help you remember your medicines on days you don't work, maybe a phone alarm        It was great to speak with you today!    I value your experience. You may receive a survey via email or text message in the next few days. I would be very thankful for your time in providing feedback.    Pharmacist's contact information:                                                      Please feel free to contact me with any questions or concerns you have.     Sakina Cordero  Pharmacist, Certified Diabetes Care and   [facility]  Phone: [phone]  December 22, 2021 at 2:41 PM

## 2021-12-23 NOTE — PATIENT INSTRUCTIONS
Patient to adhere to medication regimen outlined with clinic pharmacist this date.    Patient to call [employer] this date about health insurance for month of December.

## 2022-01-10 ENCOUNTER — OFFICE VISIT (OUTPATIENT)
Dept: FAMILY MEDICINE | Facility: CLINIC | Age: 27
End: 2022-01-10
Payer: COMMERCIAL

## 2022-01-10 ENCOUNTER — OFFICE VISIT (OUTPATIENT)
Dept: EDUCATION SERVICES | Facility: CLINIC | Age: 27
End: 2022-01-10
Payer: COMMERCIAL

## 2022-01-10 VITALS
HEIGHT: 61 IN | WEIGHT: 245 LBS | DIASTOLIC BLOOD PRESSURE: 78 MMHG | RESPIRATION RATE: 22 BRPM | BODY MASS INDEX: 46.26 KG/M2 | HEART RATE: 104 BPM | SYSTOLIC BLOOD PRESSURE: 136 MMHG | OXYGEN SATURATION: 97 % | TEMPERATURE: 98.4 F

## 2022-01-10 DIAGNOSIS — Z71.85 VACCINE COUNSELING: ICD-10-CM

## 2022-01-10 DIAGNOSIS — E66.01 CLASS 3 SEVERE OBESITY WITH SERIOUS COMORBIDITY AND BODY MASS INDEX (BMI) OF 45.0 TO 49.9 IN ADULT, UNSPECIFIED OBESITY TYPE (H): ICD-10-CM

## 2022-01-10 DIAGNOSIS — K76.0 NAFLD (NONALCOHOLIC FATTY LIVER DISEASE): ICD-10-CM

## 2022-01-10 DIAGNOSIS — E11.29 DIABETES MELLITUS WITH PROTEINURIA (H): ICD-10-CM

## 2022-01-10 DIAGNOSIS — G47.9 SLEEP DISORDER: ICD-10-CM

## 2022-01-10 DIAGNOSIS — Z59.71 INSURANCE COVERAGE PROBLEMS: ICD-10-CM

## 2022-01-10 DIAGNOSIS — E66.813 CLASS 3 SEVERE OBESITY WITH SERIOUS COMORBIDITY AND BODY MASS INDEX (BMI) OF 45.0 TO 49.9 IN ADULT, UNSPECIFIED OBESITY TYPE (H): ICD-10-CM

## 2022-01-10 DIAGNOSIS — R80.9 DIABETES MELLITUS WITH PROTEINURIA (H): ICD-10-CM

## 2022-01-10 DIAGNOSIS — E11.69 DYSLIPIDEMIA ASSOCIATED WITH TYPE 2 DIABETES MELLITUS (H): ICD-10-CM

## 2022-01-10 DIAGNOSIS — M25.362 KNEE INSTABILITY, LEFT: ICD-10-CM

## 2022-01-10 DIAGNOSIS — E11.9 TYPE 2 DIABETES MELLITUS WITHOUT COMPLICATION, WITHOUT LONG-TERM CURRENT USE OF INSULIN (H): Primary | ICD-10-CM

## 2022-01-10 DIAGNOSIS — E78.5 DYSLIPIDEMIA ASSOCIATED WITH TYPE 2 DIABETES MELLITUS (H): ICD-10-CM

## 2022-01-10 DIAGNOSIS — I10 BENIGN ESSENTIAL HYPERTENSION: ICD-10-CM

## 2022-01-10 LAB
CREAT UR-MCNC: 160 MG/DL
HBA1C MFR BLD: 8 % (ref 0–5.6)
MICROALBUMIN UR-MCNC: 45.26 MG/DL (ref 0–1.99)
MICROALBUMIN/CREAT UR: 282.9 MG/G CR

## 2022-01-10 PROCEDURE — 99207 PR NO CHARGE LOS: CPT | Performed by: PHARMACIST

## 2022-01-10 PROCEDURE — 82043 UR ALBUMIN QUANTITATIVE: CPT | Performed by: STUDENT IN AN ORGANIZED HEALTH CARE EDUCATION/TRAINING PROGRAM

## 2022-01-10 PROCEDURE — 99214 OFFICE O/P EST MOD 30 MIN: CPT | Mod: GC | Performed by: STUDENT IN AN ORGANIZED HEALTH CARE EDUCATION/TRAINING PROGRAM

## 2022-01-10 PROCEDURE — 36415 COLL VENOUS BLD VENIPUNCTURE: CPT | Performed by: STUDENT IN AN ORGANIZED HEALTH CARE EDUCATION/TRAINING PROGRAM

## 2022-01-10 PROCEDURE — 83036 HEMOGLOBIN GLYCOSYLATED A1C: CPT | Performed by: STUDENT IN AN ORGANIZED HEALTH CARE EDUCATION/TRAINING PROGRAM

## 2022-01-10 ASSESSMENT — MIFFLIN-ST. JEOR: SCORE: 1959.69

## 2022-01-10 NOTE — Clinical Note
Roxy Baca. In my mind, this could be another borderline 5 because of the medication management / overall complexity. I left it at a 4 but let me know if you change it to a 5. Thanks!

## 2022-01-10 NOTE — PROGRESS NOTES
ASSESSMENT:                            T2DM: Uncontrolled. A1c was above goal of 7% today, however improved! To further decrease blood glucose levels, need increase of metformin or GLP1, ideally not simultaneously as both can cause GI upset. Considering patient has been on starting dose of Ozempic since October and comorbid NAFLD, increasing Ozempic to 0.5 mg daily is reasonable after restarting Ozempic. Given macroalbuminuria, need further titration of ACE with eventual addition of SGLT2 inhibitor, going to be further addressed today by Dr. Edmond.   HTN: Controlled <140/90 mmHg.     PLAN:                            1. Restart Ozempic 0.25 mg each week  2. If you don't have any side effects after 1 week, increase to 0.5 mg weekly. If you experience side effects, continue on 0.25 mg for one month before increasing to 0.5 mg weekly dose.   3. Continue taking 2 tablets of Metformin per day (1000 mg/day)    See Patient Instructions for co-developed, patient-stated behavior change goals.     Follow-up: 1 month, scheduled today    SUBJECTIVE/OBJECTIVE:                          Sudhir Mueller is a 26 year old male coming in for a follow-up visit. He was referred to me from Dr. Edmond, seen today for a covisit. Today's visit is a follow-up diabetes visit from 12/22/2022.     T2DM: Sudhir was recently without insurance during a transition between employers. He was previously taking Ozempic 0.25 mg weekly, last dose ~2 weeks ago. At this point, his metformin was increased to 2 tablets (1,000mg) daily. He noted some stomach upset when first starting the medications, but this has resolved. His insurance has been reestablished with his new employer ([employer]). He measures his blood glucose a few times daily. It is difficult to determine if these can be classified as fasting or postprandial due to his changing schedule daily (he works overnights 4 nights weekly). His readings when he gets home from work at 8 am are generally  "120-130 with the last meal generally 4-6 hours beforehand and 170-180 after meals (ulcear timing, I.e. 1-2 hours?).  He notes that his appetite has decreased since starting diabetes medications last month. He goes to the gym on his 3 days off each week and 1-2 days during the work week. He works out for ~1 hour each time, a combination of cardio and resistance training. Has old knee injury that prevents him from running which he likes to do, instead walks.     Lab Results   Component Value Date    A1C 8.0 01/10/2022    A1C 11.7 09/23/2021       HTN: BP was 136/78 in clinic today. He is taking lisinopril 5 mg daily and denies any side effects.      Today's Vitals:   BP Readings from Last 1 Encounters:   01/10/22 136/78     Pulse Readings from Last 1 Encounters:   01/10/22 104     Wt Readings from Last 1 Encounters:   01/10/22 245 lb (111.1 kg)     Ht Readings from Last 1 Encounters:   01/10/22 5' 1\" (1.549 m)     Estimated body mass index is 46.29 kg/m  as calculated from the following:    Height as of an earlier encounter on 1/10/22: 5' 1\" (1.549 m).    Weight as of an earlier encounter on 1/10/22: 245 lb (111.1 kg).    Temp Readings from Last 1 Encounters:   01/10/22 98.4  F (36.9  C)       ----------------    I spent 30 minutes with this patient today. Any diabetes medication dose changes were made via the CDE Protocol and Collaborative Practice Agreement. A copy of the visit note was provided to the patient's primary care provider.    The patient was given a summary of these recommendations. See Provider note/AVS from today.     Kye Lara, Pharmacy Intern     Preceptor Attestation:  I was present with the pharmacy student who participated in the service and in the documentation of this note. I have verified the history, personally performed the medical decision making, and have verified the content of the note, which accurately reflects my assessment of the patient and the plan of care.     Sakina Cuba " Consuelo, Pharm.D., CDCES [facility]  Phone: [phone]  January 10, 2022 at 2:57 PM

## 2022-01-10 NOTE — PATIENT INSTRUCTIONS
Recommendations from today's visit:                                                      1. Restart Ozempic 0.25 mg each week  2. If you don't have any side effects after 1 week, increase to 0.5 mg weekly. If you experience side effects, continue on 0.25 mg for one month before increasing to 0.5 mg weekly dose.   3. Continue taking 2 tablets of Metformin per day (1000 mg/day)      It was great to speak with you today!    I value your experience. You may receive a survey via email or text message in the next few days. I would be very thankful for your time in providing feedback.    Pharmacist's contact information:                                                      Please feel free to contact me with any questions or concerns you have.     Sakina Cordero  Pharmacist, Certified Diabetes Care and   [facility]  Phone: [phone]  January 10, 2022 at 11:45 AM

## 2022-01-10 NOTE — PATIENT INSTRUCTIONS
Please take 10 mg of lisinopril per day instead of 5     Melatonin for sleep     Please continue your GOOD exercise and diet changes     And do the medication changes that you spoke about with Dr. Rosa Elena Cordero     I will put in a new referral for sleep study -- please

## 2022-01-10 NOTE — PROGRESS NOTES
Addendum:   A1c improved at 8 (but not yet at goal of <7). Continue with current plan. Next will look to maximize metformin (do not increase yet to minimize GI side effects). Future: SGLT2.   Microalbuminuria essentially halved. Continue pushing lisinopril to 40 mg as tolerated.     Assessment & Plan      *Co-visit with Dr. Amaya, frances.     (E11.9) Type 2 diabetes mellitus without long-term current use of insulin (H)  (primary encounter diagnosis)  (E11.29,  R80.9) Diabetes mellitus with proteinuria (H)  Comment: Due for A1c recheck today. meds as below. Recheck proteinuria today. Other assessments below.    The five goals of the D5 include:  1. Control blood pressure - yes. Below.   2. Lower bad cholesterol -  in 9/2021. Discussing statin as below.   3. Maintain blood sugar - original A1c >11. Recheck today. Home sugars improved in 100's now from 400s prior.   4. Be tobacco-free: yes.   5. Take aspirin as recommended: not prescribed - intentional.   Plan:   Hemoglobin A1c   Albumin Random Urine Quantitative with Creat Ratio  Diet / exercise counseling   Continue metformin 1000 mg daily -- look to maximize   Restart Semaglutide now that he has it again (0.25 mg weekly to start with -- increase to 0.5 mg as able/tolerated).   Push lisinopril from 5 mg to 10 mg daily   Statin as below   Look to start SGLT2 in future           (K76.0) NAFLD (nonalcoholic fatty liver disease)  Comment: U/s confirms fatty liver -- no cirrhosis and no anatomic obstructive cause of transaminase elevations. Ruled out hepatitides. Ferritin elevated but not so high as to suggest iron overload. Likely high in setting of inflammation related to metabolic syndrome.   Plan:   Dietary counseling   Semaglutide as mentioned   Statin as below     (E11.69,  E78.5) Dyslipidemia associated with type 2 diabetes mellitus (H)  Comment:  in 9/2021. Warrants starting statin. Several changes going on at same time. He is not  interested in doing this today. Benefits outweigh risks of elevated transaminases (in fact, may benefit NAFLD).     Plan:  Dietary counseling as elsewhere   Look to start statin soon.     (I10) Benign essential hypertension  Comment: Controlled! At goal of <140/90. Has struggled with this in the past -- he would like a cuff at home.   Plan:   Home Blood Pressure Monitor Order for DME -  ONLY FOR DME  Blood Pressure Monitoring (ADULT BLOOD PRESSURE CUFF LG) KIT  Instructed to keep a log   Increase lisinopril to 10 mg daily -- no additional meds at this time    (G47.9) Sleep disorder  Comment: Likely MONA (given weight and elevated STOPBANG) with now possible concurrent shift work sleep disorder. Needs new sleep study referral given new insurance. Will try melatonin to help with falling asleep.   Plan:   melatonin 3 MG tablet  Sleep study: SLEEP EVALUATION &  MANAGEMENT REFERRAL - ADULT   Discussed sleep hygiene -- continue to do so as able in current context     (M25.362) Knee instability, left  Comment: Recent event sounds like brief subluxation of patella -- potentially consistent knee instability. No evidence of full dislocation, nor ligamentous tear / effusion. May be a patellofemoral-type picture predisposing him to this. Certainly weight/BMI is contributing, especially if he is running. Would be odd for 26 year old to have arthritis but may have some degree of joint narrowing with weight.   Plan:   Counseled to biking or something lower impact in the short term   Okay to use tylenol / heat / ice   Can use NSAID for brief PRN relief but would not schedule -- may consider NSAID cream in future   Discussed some quad / glute strengthening exercises briefly   If pain continues, would consider PT referral -- holding for now     (E66.01,  Z68.42) Class 3 severe obesity with serious comorbidity and body mass index (BMI) of 45.0 to 49.9 in adult, unspecified obesity type (H)  Comment: Weight is stable. At least not going  "up. Hope is to start seeing this decrease at some point. Ultimately, weight clinic will likely be needed.   Plan:   Dietary counseling as mentioned elsewhere   Always counseling weight loss   Getting back on semaglutide as elsewhere   Revisit option of weight clinic in future -- not ready to discuss today     (Z59.89) Insurance coverage problems  Comment: Now has new insurance   Plan:   Hope is more services are covered now     (Z71.85) Vaccine counseling  Comment: due soon for 3rd HPV and any time for PPSV23 (hx diabetes).   Plan:   Did not want today   Continue to offer       Jeanine Edmond DO  [facility]    Precepted with Dr. Keven Alvarez   Sudhir is a 26 year old who presents for the following health issues    HPI     T2DM  A1c: 11.7 (9/23/21). Due for recheck today.   Sugars --- AM: 170. Lowest 120. When he first started checking: ion the 400's.   Diet - has been eating less. Trying to decrease carb intake.   Exercise -- going to gym much more. Left knee bothers him a bit.   Meds:   Metformin: 1000 mg per day   Semaglutide 0.25 q week -- had gotten away from it with insurance issues. Has it again.     HTN  Home pressures: has not been taking.    Has been taking 5 mg of lisinopril daily.     Sleep   Troubles 6-7 hours, hoping for more   Just changed from daytime to night \[facility] [place] [facility]\""   Sleep study -- needs new referral (with insurance change)   No meds       Left knee pain   Came on 6-10 months ago when he hit his knee on something hard   Has persisted since that time   Will get better for periods but then comes back  Doesn't notice it much unless he is exercising   Limits his ability to run / bike for example   Feels anterior -- not deep.   Did feel his knee \"move side to side\" a few weeks ago while running   Has not tried much in the way of meds     Review of Systems   Constitutional, HEENT, cardiovascular, pulmonary, GI, , musculoskeletal, neuro, skin, endocrine and psych " "systems are negative, except as otherwise noted.      Objective    /78   Pulse 104   Temp 98.4  F (36.9  C)   Resp 22   Ht 1.549 m (5' 1\")   Wt 111.1 kg (245 lb)   SpO2 97%   BMI 46.29 kg/m    Body mass index is 46.29 kg/m .   Wt Readings from Last 4 Encounters:   01/10/22 111.1 kg (245 lb)   11/23/21 112.5 kg (248 lb)   11/09/21 113.7 kg (250 lb 12 oz)   09/23/21 111.6 kg (246 lb)     Physical Exam   GENERAL: alert and no distress. Markedly Obese.   EYES: Eyes grossly normal to inspection, conjunctivae and sclerae normal  RESP: breathing comfortably. lungs clear to auscultation - no rales, rhonchi or wheezes  CV: appropriately perfused. regular rate and rhythm,no murmur. no peripheral edema and peripheral pulses strong  ABDOMEN: obese. soft, nontender.  SKIN: acanthosis nigricans. Otherwise, no suspicious lesions or rashes  NEURO: Normal strength and tone, mentation intact and speech normal  PSYCH: mentation appears normal, affect normal/bright  Diabetic foot exam: normal DP and PT pulses, no trophic changes or ulcerative lesions and normal sensory exam    Left knee: no effusion nor erythema. No bony step off. No tenderness to palpation. Full active and passive ROM (last 10 degrees of knee flexion limited by body habitus). Normal Lachmans, varus/valgus stress, anterior/posterior drawer, Shwetha's.       "

## 2022-01-14 ENCOUNTER — TELEPHONE (OUTPATIENT)
Dept: FAMILY MEDICINE | Facility: CLINIC | Age: 27
End: 2022-01-14
Payer: COMMERCIAL

## 2022-01-14 NOTE — TELEPHONE ENCOUNTER
Prior Authorization needed on:  1/10/2022    Medication:  Ozempic Dose:  0.25 mg x1 week, then increase to 0.5 mg weekly    Pharmacy confirmed as   [id number] - [place], [place] - [address] AT [facility] [place] [facility] [address]  [address]  [place] [address]  Phone: [phone] Fax: [phone]  : Yes    Insurance Phone: [phone]  Insurance Patient ID: [id number]    Rationale for pursuing prior authorization: Pt on Ozempic prior to change in insurance. Tolerated without concern and helped to improve blood glucose. Has not tried preferred alternatives (Trulicity, Bydureon) given this positive experience.     Sakina Amaya, [facility] January 14, 2022 at 12:23 PM

## 2022-01-16 PROBLEM — K76.0 NAFLD (NONALCOHOLIC FATTY LIVER DISEASE): Status: ACTIVE | Noted: 2022-01-16

## 2022-01-16 PROBLEM — M25.362 KNEE INSTABILITY, LEFT: Status: ACTIVE | Noted: 2022-01-16

## 2022-01-16 RX ORDER — LANOLIN ALCOHOL/MO/W.PET/CERES
3 CREAM (GRAM) TOPICAL
Qty: 30 TABLET | Refills: 3 | Status: SHIPPED | OUTPATIENT
Start: 2022-01-16 | End: 2022-12-15

## 2022-01-16 RX ORDER — NEBULIZER AND COMPRESSOR
1 EACH MISCELLANEOUS DAILY
Qty: 1 KIT | Refills: 1 | Status: SHIPPED | OUTPATIENT
Start: 2022-01-16

## 2022-01-17 PROBLEM — E78.5 DYSLIPIDEMIA ASSOCIATED WITH TYPE 2 DIABETES MELLITUS (H): Status: ACTIVE | Noted: 2022-01-17

## 2022-01-17 PROBLEM — E11.69 DYSLIPIDEMIA ASSOCIATED WITH TYPE 2 DIABETES MELLITUS (H): Status: ACTIVE | Noted: 2022-01-17

## 2022-01-18 NOTE — TELEPHONE ENCOUNTER
PA Initiation    Medication: Ozempic  Insurance Company:    Pharmacy Filling the Rx: [facility] #[id number] - [place], [place] - [address] AT [facility] [address]  Filling Pharmacy Phone: [phone]  Filling Pharmacy Fax:    Start Date: 1/18/2022

## 2022-01-18 NOTE — PROGRESS NOTES
Faculty Supervision of Residents   I have examined this patient and the medical care has been evaluated and discussed with the resident. See resident note outlining our discussion.      Aydee Baca MD

## 2022-01-19 NOTE — TELEPHONE ENCOUNTER
Prior Authorization Approval    Authorization Effective Date: 1/18/2022  Authorization Expiration Date: 1/18/2023  Medication: Ozempic  Approved Dose/Quantity: 3   Reference #:     Insurance Company:    Expected CoPay:       CoPay Card Available:      Foundation Assistance Needed:    Which Pharmacy is filling the prescription (Not needed for infusion/clinic administered): [id number] - [place], [place] - [address] AT [facility] [address]  Pharmacy Notified: Yes  Patient Notified: Yes

## 2022-02-16 DIAGNOSIS — E11.29 DIABETES MELLITUS WITH PROTEINURIA (H): Primary | ICD-10-CM

## 2022-02-16 DIAGNOSIS — R80.9 DIABETES MELLITUS WITH PROTEINURIA (H): Primary | ICD-10-CM

## 2022-02-16 RX ORDER — LISINOPRIL 5 MG/1
10 TABLET ORAL DAILY
Qty: 90 TABLET | Refills: 1 | Status: SHIPPED | OUTPATIENT
Start: 2022-02-16 | End: 2022-12-15

## 2022-02-16 NOTE — TELEPHONE ENCOUNTER
Message to physician:     Date of last visit: 1/10/2022    Date of next visit if scheduled: none    Potassium   Date Value Ref Range Status   11/09/2021 4.2 3.4 - 5.3 mmol/L Final     Creatinine   Date Value Ref Range Status   11/09/2021 0.60 mg/dL Final     GFR Estimate   Date Value Ref Range Status   11/09/2021 >90 >60 mL/min/1.73m2 Final     Comment:     As of July 11, 2021, eGFR is calculated by the CKD-EPI creatinine equation, without race adjustment. eGFR can be influenced by muscle mass, exercise, and diet. The reported eGFR is an estimation only and is only applicable if the renal function is stable.       BP Readings from Last 3 Encounters:   01/10/22 136/78   11/23/21 (!) 144/93   11/09/21 (!) 142/93       Hemoglobin A1C   Date Value Ref Range Status   01/10/2022 8.0 (H) 0.0 - 5.6 % Final     Comment:     Normal <5.7%   Prediabetes 5.7-6.4%    Diabetes 6.5% or higher     Note: Adopted from ADA consensus guidelines.       Please complete refill and CLOSE ENCOUNTER.  Closing the encounter signifies the refill is complete.

## 2022-02-17 NOTE — TELEPHONE ENCOUNTER
Pushed lisinopril from 5 to 10   Refill provided     Dino Edmond DO   [facility] Resident   Pager#[phone]

## 2022-03-28 PROBLEM — E11.9 TYPE 2 DIABETES MELLITUS WITHOUT COMPLICATION, WITHOUT LONG-TERM CURRENT USE OF INSULIN (H): Chronic | Status: ACTIVE | Noted: 2021-09-26

## 2022-03-28 PROBLEM — E66.813 CLASS 3 SEVERE OBESITY WITH SERIOUS COMORBIDITY AND BODY MASS INDEX (BMI) OF 45.0 TO 49.9 IN ADULT, UNSPECIFIED OBESITY TYPE (H): Chronic | Status: ACTIVE | Noted: 2021-09-23

## 2022-03-28 PROBLEM — E66.01 CLASS 3 SEVERE OBESITY WITH SERIOUS COMORBIDITY AND BODY MASS INDEX (BMI) OF 45.0 TO 49.9 IN ADULT, UNSPECIFIED OBESITY TYPE (H): Chronic | Status: ACTIVE | Noted: 2021-09-23

## 2022-03-28 PROBLEM — E11.29 DIABETES MELLITUS WITH PROTEINURIA (H): Status: ACTIVE | Noted: 2021-11-10

## 2022-03-28 PROBLEM — G47.9 SLEEP DISORDER: Status: ACTIVE | Noted: 2021-09-26

## 2022-03-28 PROBLEM — R80.9 DIABETES MELLITUS WITH PROTEINURIA (H): Status: ACTIVE | Noted: 2021-11-10

## 2022-03-28 PROBLEM — I10 BENIGN ESSENTIAL HYPERTENSION: Status: ACTIVE | Noted: 2021-11-23

## 2022-03-28 PROBLEM — R35.1 NOCTURIA: Status: ACTIVE | Noted: 2021-09-26

## 2022-03-29 ENCOUNTER — OFFICE VISIT (OUTPATIENT)
Dept: SLEEP MEDICINE | Facility: CLINIC | Age: 27
End: 2022-03-29
Attending: STUDENT IN AN ORGANIZED HEALTH CARE EDUCATION/TRAINING PROGRAM
Payer: COMMERCIAL

## 2022-03-29 VITALS
HEART RATE: 93 BPM | DIASTOLIC BLOOD PRESSURE: 89 MMHG | BODY MASS INDEX: 44.22 KG/M2 | WEIGHT: 234.2 LBS | SYSTOLIC BLOOD PRESSURE: 133 MMHG | OXYGEN SATURATION: 92 % | HEIGHT: 61 IN

## 2022-03-29 DIAGNOSIS — K76.0 NAFLD (NONALCOHOLIC FATTY LIVER DISEASE): ICD-10-CM

## 2022-03-29 DIAGNOSIS — E11.9 TYPE 2 DIABETES MELLITUS WITHOUT COMPLICATION, WITHOUT LONG-TERM CURRENT USE OF INSULIN (H): Chronic | ICD-10-CM

## 2022-03-29 DIAGNOSIS — R06.00 DYSPNEA AND RESPIRATORY ABNORMALITY: ICD-10-CM

## 2022-03-29 DIAGNOSIS — G47.9 DISTURBANCE IN SLEEP BEHAVIOR: ICD-10-CM

## 2022-03-29 DIAGNOSIS — G47.9 SLEEP DISORDER: Primary | ICD-10-CM

## 2022-03-29 DIAGNOSIS — J35.1 TONSILLAR HYPERTROPHY: ICD-10-CM

## 2022-03-29 DIAGNOSIS — E66.813 CLASS 3 SEVERE OBESITY WITH SERIOUS COMORBIDITY AND BODY MASS INDEX (BMI) OF 45.0 TO 49.9 IN ADULT, UNSPECIFIED OBESITY TYPE (H): Chronic | ICD-10-CM

## 2022-03-29 DIAGNOSIS — R06.89 DYSPNEA AND RESPIRATORY ABNORMALITY: ICD-10-CM

## 2022-03-29 DIAGNOSIS — G47.26 SHIFT WORK SLEEP DISORDER: ICD-10-CM

## 2022-03-29 DIAGNOSIS — E66.01 CLASS 3 SEVERE OBESITY WITH SERIOUS COMORBIDITY AND BODY MASS INDEX (BMI) OF 45.0 TO 49.9 IN ADULT, UNSPECIFIED OBESITY TYPE (H): Chronic | ICD-10-CM

## 2022-03-29 PROCEDURE — 99204 OFFICE O/P NEW MOD 45 MIN: CPT | Performed by: INTERNAL MEDICINE

## 2022-03-29 NOTE — PROGRESS NOTES
Outpatient Sleep Medicine Consultation:  March 29, 2022    Name: Sudhir Mueller MRN# [id number]   Age: 26 year old YOB: 1995     Date of Consultation: March 29, 2022  Consultation is requested by: Aydee Baca MD  [address] [place] [address] Aydee Baca  Primary care provider: Jeanine Edmond       Reason for Sleep Consult:     Sudhir Mueller is sent by Aydee Baca for a sleep consultation regarding 'sleep disorder'.    Patient s Reason for visit  Sudhir Mueller main reason for visit:  Hard time sleeping  Patient states problem(s) started:  2 years           Assessment and Plan:     Socially disruptive snoring, witnessed apneas, sleep maintenance difficulties, paroxysmal nocturnal dyspnea, heart racing at night, reflux at night, nocturia, morning headaches, minimal daytime sleepiness ESS 5. Obesity, tonsillar hypertrophy. Comorbid diabetes mellitus   - Patient appears to be a good candidate for Home Sleep Test due to night shift work. STOPBANG  6       Sleep onset, maintenance difficulties (CALLI 20)  Shift work disorder,  sleep disordered breathing suspected. He does have some evidence of hyper arousal.   Recommend later bedtimes and wake times on days off    Summary Counseling:    Sleep Testing Reviewed  Obstructive Sleep Apnea Reviewed  Complications of Untreated Sleep Apnea Reviewed        I spent 45 minutes with patient including counseling, and 10 minutes with chart review, and documentation     CC: Aydee Baca          History of Present Illness:       SLEEP-WAKE SCHEDULE:     Work/School Days: Patient goes to school/work: 9 PM to 720 AM  Usually gets into bed at  830 AM   Takes patient about 30 minutes to fall asleep  Wakes up infrequently  Patient is usually up at 3-4 PM  Uses alarm:  No    Weekends/Non-work Days/All Other Days:  Usually gets into bed at  1-2 AM  Has trouble falling asleep 2-4  nights per week  Wakes up due to Use bathroom, snorting,  uncertain reasons  Wakes up in the middle of the night 1-2  Times.  He has trouble falling back asleep freqently  It usually takes 1-2 hours to get back to sleep. Uses his phone  Patient is usually up at  10 AM - 2 PM      Sleep Need  Patient gets  4-6  sleep on average   Patient thinks he needs about 8-9  sleep    Sudhir Her prefers to sleep in this position(s):   Side, stomach, head of bed elevated  Patient states they do the following activities in bed:  phone    Naps  Patient takes a purposeful nap 4-5 times a week and naps are usually 30-60 in duration  He feels better after a nap:  Yes  He dozes off unintentionally 4-5 days per week  Patient has had a driving accident or near-miss due to sleepiness/drowsiness:  No      SLEEP DISRUPTIONS:    Breathing/Snoring  Patient snores: Yes  Other people complain about his snoring: Yes   Patient has been told he stops breathing in his sleep: Yes     Movement:  Patient gets pain, discomfort, with an urge to move:  No   Patient has been told he kicks his legs at night:   Yes     Behaviors in Sleep:  Sudhir Her has experienced the following behaviors while sleeping:    Sleep walking, sleep talking      CAFFEINE AND OTHER SUBSTANCES:    Patient consumes caffeinated beverages per day:   1   List of any prescribed or over the counter sleep medication patient takes:  melatonin  Patient drinks alcohol to help them sleep:  No      SCALES:    EPWORTH SLEEPINESS SCALE      [place] Sleepiness Scale ( ITZ Keith  1990-1997[facility] [place]/English - Final version - 21 Nov 07 - [facility] [employer].) 3/29/2022   [place] Score (Sleep) 5         INSOMNIA SEVERITY INDEX (CALLI)      Insomnia Severity Index (CALLI) 3/29/2022   Difficulty falling asleep 3   Difficulty staying asleep 2   Problems waking up too early 2   How SATISFIED/DISSATISFIED are you with your CURRENT sleep pattern? 3   How NOTICEABLE to others do you think your sleep problem is in terms of impairing the quality of your life? 4    How WORRIED/DISTRESSED are you about your current sleep problem? 4   To what extent do you consider your sleep problem to INTERFERE with your daily functioning (e.g. daytime fatigue, mood, ability to function at work/daily chores, concentration, memory, mood, etc.) CURRENTLY? 2   CALLI Total Score 20       Guidelines for Scoring/Interpretation:    Total score categories:  0-7 = No clinically significant insomnia   8-14 = Subthreshold insomnia   15-21 = Clinical insomnia (moderate severity)  22-28 = Clinical insomnia (severe)    Used via courtesy of www.[employer].va.gov with permission from Vlad Mendieta PhD., [facility]      Allergies:    Allergies   Allergen Reactions     No Known Allergies        Medications:    Current Outpatient Medications   Medication Sig Dispense Refill     albuterol (PROAIR HFA;PROVENTIL HFA;VENTOLIN HFA) 90 mcg/actuation inhaler [ALBUTEROL (PROAIR HFA;PROVENTIL HFA;VENTOLIN HFA) 90 MCG/ACTUATION INHALER] Inhale 2 puffs every 6 (six) hours as needed for wheezing. Use with spacer. 1 each 0     ASPIRIN NOT PRESCRIBED (INTENTIONAL) Please choose reason not prescribed from choices below.       blood glucose (NO BRAND SPECIFIED) lancets standard Use to test blood sugar 1-2 times daily or as directed. 100 each 4     blood glucose (NO BRAND SPECIFIED) test strip Use to test blood sugar 1-2 times daily or as directed. 100 strip 4     blood glucose calibration (ONETOUCH VERIO) solution Use to calibrate blood glucose monitor as needed as directed. 100 each 3     blood glucose monitoring (NO BRAND SPECIFIED) meter device kit Use to test blood sugar 1-2 times daily or as directed. 1 kit 1     Blood Pressure Monitoring (ADULT BLOOD PRESSURE CUFF LG) KIT 1 each daily 1 kit 1     lisinopril (ZESTRIL) 5 MG tablet Take 2 tablets (10 mg) by mouth daily 90 tablet 1     melatonin 3 MG tablet Take 1 tablet (3 mg) by mouth nightly as needed for sleep 30 tablet 3     metFORMIN (GLUCOPHAGE) 500 MG tablet  Take 1 tablet (500 mg) by mouth 2 times daily (with meals) Start by taking one tablet once daily with meals. After 1 week, build to 1 tablet in the morning and 1 tablet in the evening daily. 30 tablet 1     semaglutide (OZEMPIC) 2 MG/1.5ML SOPN pen Inject 0.25 mg Subcutaneous every 7 days , after 1 week increase to 0.5 mg weekly if no side effects 1.5 mL 3     STATIN NOT PRESCRIBED (INTENTIONAL) Please choose reason not prescribed from choices below.         Problem List:  Patient Active Problem List    Diagnosis Date Noted     NAFLD (nonalcoholic fatty liver disease) 01/16/2022     Priority: Medium     ultrasound 12/2021       Transaminitis 11/23/2021     Priority: Medium     Elevated blood pressure reading without diagnosis of hypertension 11/10/2021     Priority: Medium     Type 2 diabetes mellitus without complication, without long-term current use of insulin (H) 09/26/2021     Priority: Medium     Class 3 severe obesity with serious comorbidity and body mass index (BMI) of 45.0 to 49.9 in adult, unspecified obesity type (H) 09/23/2021     Priority: Medium     Dyslipidemia associated with type 2 diabetes mellitus (H) 01/17/2022     Priority: Low     Knee instability, left 01/16/2022     Priority: Low     Diabetes mellitus with proteinuria (H) 11/10/2021     Priority: Low     Nocturia 09/26/2021     Priority: Low        Past Medical/Surgical History:  No past medical history on file.  Past Surgical History:   Procedure Laterality Date     NO HISTORY OF SURGERY         Social History:  Social History     Socioeconomic History     Marital status: Single     Spouse name: Not on file     Number of children: Not on file     Years of education: Not on file     Highest education level: Not on file   Occupational History     Occupation:  at [employer]   Tobacco Use     Smoking status: Never Smoker     Smokeless tobacco: Never Used   Substance and Sexual Activity     Alcohol use: Yes     Comment: rarely 1 time  "per month     Drug use: Never     Sexual activity: Not on file   Other Topics Concern     Not on file   Social History Narrative     Not on file     Social Determinants of Health     Financial Resource Strain: Not on file   Food Insecurity: Not on file   Transportation Needs: Not on file   Physical Activity: Not on file   Stress: Not on file   Social Connections: Not on file   Intimate Partner Violence: Not on file   Housing Stability: Not on file       Family History:  Family History   Problem Relation Age of Onset     Cancer Father      Diabetes No family hx of      Heart Disease No family hx of        Review of Systems:  A complete review of systems reviewed by me is negative with the exeption of what has been mentioned in the history of present illness.    Vision changes, AM dry mouth, orthopnea, paroxysmal nocturnal dyspnea, heart racing at night, reflux at night, nocturia, morning headaches    Physical Examination:  Vitals: /89   Pulse 93   Ht 1.549 m (5' 1\")   Wt 106.2 kg (234 lb 3.2 oz)   SpO2 92%   BMI 44.25 kg/m    BMI= Body mass index is 44.25 kg/m .    Neck Cir (cm): 48 cm      GENERAL APPEARANCE: alert and no distress  EYES: Eyes grossly normal to inspection and conjunctivae and sclerae normal  HENT: nose and mouth without ulcers or lesions, oropharynx crowded and tonsillar hypertrophy  NECK: no adenopathy, no asymmetry, masses, or scars and thyroid normal to palpation  RESP: lungs clear to auscultation - no rales, rhonchi or wheezes  CV: regular rates and rhythm  ABDOMEN: protuberant  MS: extremities normal- no gross deformities noted  SKIN: no suspicious lesions or rashes  NEURO: Normal strength and tone, mentation intact, speech normal and cranial nerves 2-12 intact  PSYCH: mentation appears normal and affect normal/bright  Mallampati Class: II.  Tonsillar Stage: 3  extending beyond pillars.         Data: All pertinent previous laboratory data reviewed     Recent Labs   Lab Test " 11/09/21  0958      POTASSIUM 4.2   CHLORIDE 98   CO2 28   ANIONGAP 15*   *   BUN 10   CR 0.60   ISAAC 9.7       Recent Labs   Lab Test 11/09/21  0958   PROTTOTAL 7.8   ALBUMIN 3.7   BILITOTAL 0.9   ALKPHOS 66   *          Iron Sat Index   Date/Time Value Ref Range Status   12/02/2021 09:05 AM 19 15 - 46 % Final     Ferritin   Date/Time Value Ref Range Status   12/02/2021 09:05  (H) 27 - 300 ng/mL Final       Sheldon Henson MD 3/29/2022

## 2022-03-29 NOTE — NURSING NOTE
"Chief Complaint   Patient presents with     Sleep Problem     Has trouble sleeping. Started working over night.        Initial There were no vitals taken for this visit. Estimated body mass index is 46.29 kg/m  as calculated from the following:    Height as of 1/10/22: 1.549 m (5' 1\").    Weight as of 1/10/22: 111.1 kg (245 lb).    Medication Reconciliation: complete    Neck circumference: 48 centimeters.    Elysia Peña CMA on 3/29/2022 at 2:15 PM    "

## 2022-05-26 ENCOUNTER — OFFICE VISIT (OUTPATIENT)
Dept: SLEEP MEDICINE | Facility: CLINIC | Age: 27
End: 2022-05-26

## 2022-05-26 DIAGNOSIS — G47.9 DISTURBANCE IN SLEEP BEHAVIOR: ICD-10-CM

## 2022-05-26 DIAGNOSIS — G47.33 OSA (OBSTRUCTIVE SLEEP APNEA): Primary | ICD-10-CM

## 2022-05-26 DIAGNOSIS — J35.1 TONSILLAR HYPERTROPHY: ICD-10-CM

## 2022-05-26 DIAGNOSIS — E66.813 CLASS 3 SEVERE OBESITY WITH SERIOUS COMORBIDITY AND BODY MASS INDEX (BMI) OF 45.0 TO 49.9 IN ADULT, UNSPECIFIED OBESITY TYPE (H): Chronic | ICD-10-CM

## 2022-05-26 DIAGNOSIS — Z20.822 COVID-19 RULED OUT: ICD-10-CM

## 2022-05-26 DIAGNOSIS — R06.00 DYSPNEA AND RESPIRATORY ABNORMALITY: ICD-10-CM

## 2022-05-26 DIAGNOSIS — G47.9 SLEEP DISORDER: ICD-10-CM

## 2022-05-26 DIAGNOSIS — K76.0 NAFLD (NONALCOHOLIC FATTY LIVER DISEASE): ICD-10-CM

## 2022-05-26 DIAGNOSIS — G47.26 SHIFT WORK SLEEP DISORDER: ICD-10-CM

## 2022-05-26 DIAGNOSIS — R06.89 DYSPNEA AND RESPIRATORY ABNORMALITY: ICD-10-CM

## 2022-05-26 DIAGNOSIS — E66.01 CLASS 3 SEVERE OBESITY WITH SERIOUS COMORBIDITY AND BODY MASS INDEX (BMI) OF 45.0 TO 49.9 IN ADULT, UNSPECIFIED OBESITY TYPE (H): Chronic | ICD-10-CM

## 2022-05-26 DIAGNOSIS — E11.9 TYPE 2 DIABETES MELLITUS WITHOUT COMPLICATION, WITHOUT LONG-TERM CURRENT USE OF INSULIN (H): Chronic | ICD-10-CM

## 2022-05-26 PROCEDURE — G0399 HOME SLEEP TEST/TYPE 3 PORTA: HCPCS | Performed by: INTERNAL MEDICINE

## 2022-05-27 ENCOUNTER — DOCUMENTATION ONLY (OUTPATIENT)
Dept: SLEEP MEDICINE | Facility: CLINIC | Age: 27
End: 2022-05-27

## 2022-05-28 ENCOUNTER — HEALTH MAINTENANCE LETTER (OUTPATIENT)
Age: 27
End: 2022-05-28

## 2022-05-31 NOTE — PROCEDURES
"HOME SLEEP STUDY INTERPRETATION    Patient: Sudhir Mueller  MRN: [id number]  YOB: 1995  Study Date: 5/26/2022  PCP/Referring Provider: Jeanine Edmond;  Ordering Provider: Sheldon Henson MD     Indications for Home Study: Sudhir Mueller is a 26 year old male with symptoms suggestive of obstructive sleep apnea.    Estimated body mass index is 44.25 kg/m  as calculated from the following:    Height as of 3/29/22: 1.549 m (5' 1\").    Weight as of 3/29/22: 106.2 kg (234 lb 3.2 oz).  Total score - [place]: 5 (3/29/2022  2:00 PM)  StopBang Total Score: 6 (3/29/2022  2:00 PM)    Data: A full night home sleep study was performed recording the standard physiologic parameters including body position, movement, sound, nasal pressure, thermal oral airflow, chest and abdominal movements with respiratory inductance plethysmography, and oxygen saturation by pulse oximetry. Pulse rate was estimated by oximetry recording. This study was considered adequate based on > 4 hours of quality oximetry and respiratory recording. As specified by the AASM Manual for the Scoring of Sleep and Associated events, version 2.3, Rule VIII.D 1B, 4% oxygen desaturation scoring for hypopneas is used as a standard of care on all home sleep apnea testing.    Analysis Time:  267 minutes    Respiration:   Sleep Associated Hypoxemia: sustained hypoxemia was present. Baseline oxygen saturation was 89-93%.  Time with saturation less than or equal to 88% was 89 minutes. The lowest oxygen saturation was 51%.   Snoring: Snoring was present.  Respiratory events: The home study revealed a presence of 190 obstructive apneas and 23 mixed and central apneas. There were 18 hypopneas resulting in a combined apnea/hypopnea index [AHI] of 51 events per hour.  AHI was 52 per hour supine, 40 per hour prone, 56 per hour on left side, and n/a per hour on right side.   Pattern: Excluding events noted above, respiratory rate and pattern was occasionally periodic " appearig.    Position: Percent of time spent: supine - 76%, prone - 7%, on left - 16%, on right - 0%.    Heart Rate: By pulse oximetry bradycardia was noted, though oximeter readings were often poor    Assessment:   Severe obstructive sleep apnea with long events  Profound sleep associated hypoxemia was suggested though oximeter readings were poor.    Recommendations:  Urgent Polysomnogram with transcutaneous C02 monitoring and all night-titration of PAP  Suggest optimizing sleep hygiene and avoiding sleep deprivation.  Weight management.    Diagnosis Code(s): Obstructive Sleep Apnea G47.33, Hypoxemia G47.36    Sheldon Henson MD, May 31, 2022   Diplomate, American Board of Internal Medicine, Sleep Medicine

## 2022-05-31 NOTE — PROGRESS NOTES
HST POST-STUDY QUESTIONNAIRE    1. What time did you go to bed?  7:15 am  2. How long do you think it took to fall asleep?  5 minutes  3. What time did you wake up to start the day?  12:45   4. Did you get up during the night at all?  NO  5. If you woke up, do you remember approximately what time(s)? NA  6. Did you have any difficulty with the equipment?  Yes Jut the tape on my face and nose being plugged.  7. Did you us any type of treatment with this study?  None  8. Was the head of the bed elevated? No  9. Did you sleep in a recliner?  No  10. Did you stop using CPAP at least 3 days before this test?  NA  11. Any other information you'd like us to know? Will I be able to get a CPAP machine after my result come in ?

## 2022-05-31 NOTE — PROGRESS NOTES
Home sleep test shows very severe obstructive sleep apnea with very low Oxygen levels  Recommend urgent Polysomnogram with transcutaneous C02 monitoring and all-night titration of PAP  Orders placed  Patient has a follow-up schedule 6/9/22 if he wants to go over details, but would try and get him scheduled ASAP. This probably needs to be a DAY study

## 2022-05-31 NOTE — PROGRESS NOTES
This HSAT was performed using a Noxturnal T3 device which recorded snore, sound, movement activity, body position, nasal pressure, oronasal thermal airflow, pulse, oximetry and both chest and abdominal respiratory effort. HSAT data was restricted to the time patient states they were in bed.     HSAT was scored using 1B 4% hypopnea rule.     AHI: 51.9. Snoring was reported as loud.  Time with SpO2 below 89% was 89.7 minutes.   Overall signal quality was fair. SPO2 come off many times due to heavy snoring.    Pt will follow up with sleep provider to determine appropriate therapy.     Ordering Provider, Sheldon Henson MD Charles O., BA, RPS, RST System Clinical Specialist 5/31/2022

## 2022-06-01 ENCOUNTER — TELEPHONE (OUTPATIENT)
Dept: SLEEP MEDICINE | Facility: CLINIC | Age: 27
End: 2022-06-01
Payer: MEDICAID

## 2022-06-01 NOTE — NURSING NOTE
Dr. Henson,  Patient scheduled for a PSG Titration w/ TCM on 6/9/2022.  2 weeks later is 6/23/2022, and the next week provider is out of office. Currently scheduled for follow-up 8/23/2022 (Is on wait list). Does provider want to fit him in for a Video Visit the week of 6/20/22-6/23/22?  Birgit Aggarwal, CMA

## 2022-06-01 NOTE — NURSING NOTE
Patient scheduled for PSG Titration w/ TCM and follow-up appointment with provider. Birgit Aggarwal, BENITO

## 2022-06-01 NOTE — TELEPHONE ENCOUNTER
"Dr. Henson,    Patient scheduled for a PSG Titration w/ TCM on 6/9/2022.  2 weeks later is 6/23/2022, and the next week provider is out of office. Currently scheduled for follow-up 8/23/2022 (Is on wait list). Does provider want to fit him in for a Video Visit the week of 6/20/22-6/23/22?    Provider said: \"Home sleep test shows very severe obstructive sleep apnea with very low Oxygen levels  Recommend urgent Polysomnogram with transcutaneous C02 monitoring and all-night titration of PAP  Orders placed  Patient has a follow-up schedule 6/9/22 if he wants to go over details, but would try and get him scheduled ASAP. This probably needs to be a DAY study \".    Birgit Aggarwal, [facility]    "

## 2022-06-01 NOTE — TELEPHONE ENCOUNTER
CMA spoke with provider in person.  He okayed a Video Visit at 4 PM one of the days: 6/20-6/22.  Patient scheduled. Birgit Aggarwal CMA

## 2022-07-27 ENCOUNTER — LAB (OUTPATIENT)
Dept: LAB | Facility: CLINIC | Age: 27
End: 2022-07-27
Attending: INTERNAL MEDICINE
Payer: MEDICAID

## 2022-07-27 DIAGNOSIS — Z20.822 COVID-19 RULED OUT: ICD-10-CM

## 2022-07-27 LAB — SARS-COV-2 RNA RESP QL NAA+PROBE: POSITIVE

## 2022-07-27 PROCEDURE — U0005 INFEC AGEN DETEC AMPLI PROBE: HCPCS

## 2022-07-27 PROCEDURE — U0003 INFECTIOUS AGENT DETECTION BY NUCLEIC ACID (DNA OR RNA); SEVERE ACUTE RESPIRATORY SYNDROME CORONAVIRUS 2 (SARS-COV-2) (CORONAVIRUS DISEASE [COVID-19]), AMPLIFIED PROBE TECHNIQUE, MAKING USE OF HIGH THROUGHPUT TECHNOLOGIES AS DESCRIBED BY CMS-2020-01-R: HCPCS

## 2022-07-28 ENCOUNTER — TELEPHONE (OUTPATIENT)
Dept: NURSING | Facility: CLINIC | Age: 27
End: 2022-07-28

## 2022-07-28 ENCOUNTER — MYC MEDICAL ADVICE (OUTPATIENT)
Dept: CARE COORDINATION | Facility: CLINIC | Age: 27
End: 2022-07-28

## 2022-07-28 NOTE — TELEPHONE ENCOUNTER
Patient classified as COVID treatment eligible by Epic high risk algorithm:  Yes    Coronavirus (COVID-19) Notification    Reason for call  Notify of POSITIVE COVID-19 lab result, assess symptoms,  review [facility] recommendations    Lab Result   Lab test for 2019-nCoV rRt-PCR or SARS-COV-2 PCR  Oropharyngeal AND/OR nasopharyngeal swabs were POSITIVE for 2019-nCoV RNA [OR] SARS-COV-2 RNA (COVID-19) RNA     We have been unable to reach patient by phone at this time to notify of their Positive COVID-19 result.    Unable to LVM--[facility] message sent by Clinic.       A Positive COVID-19 letter will be sent via [facility] or the mail.    Blanca Pruett

## 2022-07-31 ENCOUNTER — THERAPY VISIT (OUTPATIENT)
Dept: SLEEP MEDICINE | Facility: CLINIC | Age: 27
End: 2022-07-31
Attending: INTERNAL MEDICINE
Payer: MEDICAID

## 2022-07-31 DIAGNOSIS — G47.33 OSA (OBSTRUCTIVE SLEEP APNEA): Primary | ICD-10-CM

## 2022-07-31 PROCEDURE — 95811 POLYSOM 6/>YRS CPAP 4/> PARM: CPT | Performed by: INTERNAL MEDICINE

## 2022-07-31 NOTE — Clinical Note
Manjinder, we should look at this together. I changed a bunch of events to centrals. Maybe you could look at it again and we can talk over noon hour tomorrow? Look at the pressure tracings in epoch 684, why does the IPAP disappear do you think?

## 2022-08-01 NOTE — PROGRESS NOTES
Completed a all night titration PSG per provider order.    Preliminary AHI n/a.  A final therapeutic PAP pressure was possibly achieved, although this is more clear for lateral sleep than supine sleep.    Supine REM was not seen on therapeutic pressure.    Patient reports feeling refreshed in AM.

## 2022-08-01 NOTE — PATIENT INSTRUCTIONS
[place] [facility]    1. Your sleep study will be reviewed by a sleep physician within the next few days.     2. Please follow up in the sleep clinic as scheduled, or, make an appointment with your sleep provider to be seen within two weeks to discuss the results of the sleep study.    3. If you have any questions or problems with your treatment plan, please contact your sleep clinic provider at [phone] to further manage your condition.    4. Please review your attached medication list, and, at your follow-up appointment advise your sleep clinic provider about any changes.    5. Go to [email or web address] for more information about your sleep problems.    KASEY Manning  August 1, 2022

## 2022-08-03 LAB — SLPCOMP: NORMAL

## 2022-08-03 NOTE — PROCEDURES
"SLEEP STUDY INTERPRETATION  TITRATION STUDY      Patient: MARIA DEL ROSARIO COHEN  YOB: 1995  Study Date: 7/31/2022  MRN: [id number]  Referring Provider: MD Baca Kathryn Michelle  Ordering Provider: MD Henson Kent    Indications for Polysomnography: The patient is a 27 year old Male who is 5' 1\" and weighs 234.0 lbs.  His BMI is 44.8, [place] sleepiness scale is 5 and neck size is 48.  A diagnostic polysomnogram PAP titration was performed for severe obstructive sleep apnea with hypoxemia on Home Sleep Apnea Test 6/22/22    Polysomnogram Data:  A full night polysomnogram recorded the standard physiologic parameters including EEG, EOG, EMG, ECG, nasal and oral airflow.  Respiratory parameters of chest and abdominal movements were recorded with respiratory inductance plethysmography.  Oxygen saturation was recorded by pulse oximetry.      Treatment PSG:  Sleep Architecture:   The total recording time of the study was 533.2 minutes.  The total sleep time was 502.5 minutes.  Sleep latency was decreased at 2.2 minutes without the use of a sleep aid.  REM latency was 82.5 minutes.  Arousal index was normal at 13.7 arousals per hour.  Sleep efficiency was normal at 94.2%.  Wake after sleep onset was 28.0 minutes.   The patient spent 6.1% of total sleep time in Stage N1, 37.5% in Stage N2, 23.3% in Stage N3 and 33.1% in REM.     Respiration:     The patient was titrated at pressures ranging from 5 cmH2O up to 21/13 cmH2O.  Obstructive apnea events were resolved on 8 cmH20. Supine REM was seen at 13 cmH20 with resolution of events, hypoxemia and TCM 53 mmHg.  Due to elevation of TCM to 59 mmHg patient was placed on bilevel 17/12 mmHg and titrated as high 21/13 mmHg. No effective pressure was attained on bilevel due to occasional failure to trigger IPAP (likely due to inadequate trigger sensitivity) and development of treatment-related central apneas on higher pressures    This titration was considered optimal at 13 cmH20 " (residual AHI < 5 events per hour and including REM-supine sleep at final pressure) with CPAP but possible incomplete treatment of hypercarbia.     Snoring - was reported as frequent and loud at lower pressures, largely eliminated at higher pressures    Respiratory rate and pattern - was notable for normal respiratory pattern.    Sustained Sleep Associated Hypoventilation - Transcutaneous carbon dioxide monitoring was used. Baseline TCM was 48-49 mmHg with a peak of 65 mmHg early in the study on CPAP 10 cmH20. On Bilevel TCM peaked at 70 mmHg with 21/13 cmH20 and subsequently declined to 60 mmHg on same pressureSleep Associated Hypoxemia - (Greater than 5 minutes O2 sat below 89%) was present.  Baseline oxygen saturation was 91.3%. Lowest oxygen saturation was 65.0%.  Time spent less than or equal to 88% was 85 minutes.  Time spent less than or equal to 89% was 101 minutes.         Movement Activity:     Periodic Limb Activity - There were 4 PLMs during the entire study. The PLM index was 0.5 movements per hour.  The PLM Arousal Index was 0 per hour.    REM EMG Activity - Excessive transient / sustained muscle activity was not present.    Nocturnal Behavior - Abnormal sleep related behaviors were not noted     Bruxism - None apparent.    Cardiac Summary:   The average pulse rate was 84.7 bpm.  The minimum pulse rate was 58.0 bpm while the maximum pulse rate was 128.0 bpm. The rhythm is normal sinus. Arrhythmias were not noted.        Assessment:     Complicated titration    Recommendations:    Trial of treatment with Bilevel PAP at 14/8 cmH2O.  Recommend clinical follow up with sleep management team, for coaching and review of effectiveness and compliance measures.    Advise regarding the risks of drowsy driving.    Suggest optimizing sleep schedule and avoiding sleep deprivation.    Weight management (if BMI > 30).    Pharmacologic therapy should be used for management of restless legs syndrome only if present and  clinically indicated and not based on the presence of periodic limb movements alone.    Cardiac Comments: :Normal Sinus Rhythm  Diagnostic Codes:    Obstructive Sleep Apnea G47.33    Sleep Hypoxemia/Hypoventilation G47.36         _____________________________________   Electronically signed by Sheldon Henson MD 8/3/22         Table of Oximetry Distribution    Range(%) Time in range (min) Time in range (%) Time in or below range (min) Time in or below range (%)   0.0 - 88.0 86.7 16.3% 86.7 16.3%   0.0 - 89.0 102.8 19.4% 102.8 19.4%

## 2022-08-09 ENCOUNTER — TELEPHONE (OUTPATIENT)
Dept: SLEEP MEDICINE | Facility: CLINIC | Age: 27
End: 2022-08-09

## 2022-08-09 NOTE — TELEPHONE ENCOUNTER
Left message to let patient know we currently do not have BIPAP machines in stock and to return call if he would like to be put on the wait list.

## 2022-08-10 NOTE — TELEPHONE ENCOUNTER
Patient called [facility]'s customer service line and wants to get on the BIPAP wait list.

## 2022-08-29 ASSESSMENT — SLEEP AND FATIGUE QUESTIONNAIRES
HOW LIKELY ARE YOU TO NOD OFF OR FALL ASLEEP WHILE WATCHING TV: SLIGHT CHANCE OF DOZING
HOW LIKELY ARE YOU TO NOD OFF OR FALL ASLEEP IN A CAR, WHILE STOPPED FOR A FEW MINUTES IN TRAFFIC: WOULD NEVER DOZE
HOW LIKELY ARE YOU TO NOD OFF OR FALL ASLEEP WHILE SITTING AND READING: SLIGHT CHANCE OF DOZING
HOW LIKELY ARE YOU TO NOD OFF OR FALL ASLEEP WHILE LYING DOWN TO REST IN THE AFTERNOON WHEN CIRCUMSTANCES PERMIT: SLIGHT CHANCE OF DOZING
HOW LIKELY ARE YOU TO NOD OFF OR FALL ASLEEP WHILE SITTING QUIETLY AFTER LUNCH WITHOUT ALCOHOL: WOULD NEVER DOZE
HOW LIKELY ARE YOU TO NOD OFF OR FALL ASLEEP WHEN YOU ARE A PASSENGER IN A CAR FOR AN HOUR WITHOUT A BREAK: MODERATE CHANCE OF DOZING
HOW LIKELY ARE YOU TO NOD OFF OR FALL ASLEEP WHILE SITTING INACTIVE IN A PUBLIC PLACE: SLIGHT CHANCE OF DOZING
HOW LIKELY ARE YOU TO NOD OFF OR FALL ASLEEP WHILE SITTING AND TALKING TO SOMEONE: WOULD NEVER DOZE

## 2022-08-30 ENCOUNTER — VIRTUAL VISIT (OUTPATIENT)
Dept: SLEEP MEDICINE | Facility: CLINIC | Age: 27
End: 2022-08-30
Payer: COMMERCIAL

## 2022-08-30 VITALS — WEIGHT: 238 LBS | BODY MASS INDEX: 42.17 KG/M2 | HEIGHT: 63 IN

## 2022-08-30 DIAGNOSIS — G47.33 OSA (OBSTRUCTIVE SLEEP APNEA): Primary | Chronic | ICD-10-CM

## 2022-08-30 DIAGNOSIS — E66.01 CLASS 3 SEVERE OBESITY WITH SERIOUS COMORBIDITY AND BODY MASS INDEX (BMI) OF 45.0 TO 49.9 IN ADULT, UNSPECIFIED OBESITY TYPE (H): Chronic | ICD-10-CM

## 2022-08-30 DIAGNOSIS — G47.26 SHIFT WORK SLEEP DISORDER: ICD-10-CM

## 2022-08-30 DIAGNOSIS — G47.10 EXCESSIVE SLEEPINESS: ICD-10-CM

## 2022-08-30 DIAGNOSIS — E66.813 CLASS 3 SEVERE OBESITY WITH SERIOUS COMORBIDITY AND BODY MASS INDEX (BMI) OF 45.0 TO 49.9 IN ADULT, UNSPECIFIED OBESITY TYPE (H): Chronic | ICD-10-CM

## 2022-08-30 PROCEDURE — 99215 OFFICE O/P EST HI 40 MIN: CPT | Mod: 95 | Performed by: INTERNAL MEDICINE

## 2022-08-30 RX ORDER — MODAFINIL 100 MG/1
100 TABLET ORAL DAILY
Qty: 60 TABLET | Refills: 1 | Status: SHIPPED | OUTPATIENT
Start: 2022-08-30 | End: 2022-11-14

## 2022-08-30 NOTE — PROGRESS NOTES
Sudhir is a 27 year old who is being evaluated via a billable video visit.      How would you like to obtain your AVS? MyChart  If the video visit is dropped, the invitation should be resent by: Text to cell phone: [phone]  Will anyone else be joining your video visit? Shannon Cornell        Video-Visit Details    Video Start Time: 1:47 PM    Type of service:  Video Visit    Video End Time:2:25 PM    Originating Location (pt. Location): Home    Distant Location (provider location):  [facility]     Platform used for Video Visit: [facility]         Sleep Study Follow-Up Visit:    Date on this visit: 8/30/2022    Sudhir Her comes in today for follow-up of his sleep study done at the [facility] for socially disruptive snoring, witnessed apneas, sleep maintenance difficulties, paroxysmal nocturnal dyspnea, heart racing at night, reflux at night, nocturia, morning headaches, minimal daytime sleepiness (ESS 5), obesity, tonsillar hypertrophy. Comorbid diabetes mellitus   -  Sleep onset, maintenance difficulties (CALLI 20). 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 He does have some evidence of hyper arousal.     Home Sleep ApneaTest  5/26/2022  (234 lb 3.2 oz).  - Apnea/hypopnea index [AHI] of 51 events per hour.    - AHI was 52 per hour supine, 40 per hour prone, 56 per hour on left side, and n/a per hour on right side.   - Percent of time spent: supine - 76%, prone - 7%, on left - 16%, on right - 0%.  - Time with saturation less than or equal to 88% was 89 minutes. The lowest oxygen saturation was 51%.     Study Date: 7/31/2022 (234.0 lbs)     Treatment PSG:  Sleep Architecture:   The total recording time of the study was 533.2 minutes.  The total sleep time was 502.5 minutes.  Sleep latency was decreased at 2.2 minutes without the use of a sleep aid.  REM latency was 82.5 minutes.  Arousal index was normal at 13.7 arousals per hour.  Sleep efficiency was  normal at 94.2%.  Wake after sleep onset was 28.0 minutes.   The patient spent 6.1% of total sleep time in Stage N1, 37.5% in Stage N2, 23.3% in Stage N3 and 33.1% in REM.      Respiration:     The patient was titrated at pressures ranging from 5 cmH2O up to 21/13 cmH2O.  Obstructive apnea events were resolved on 8 cmH20. Supine REM was seen at 13 cmH20 with resolution of events, hypoxemia and TCM 53 mmHg.  Due to elevation of TCM to 59 mmHg patient was placed on bilevel 17/12 mmHg and titrated as high 21/13 mmHg. No effective pressure was attained on bilevel due to occasional failure to trigger IPAP (likely due to inadequate trigger sensitivity) and development of treatment-related central apneas on higher pressures    This titration was considered optimal at 13 cmH20 (residual AHI < 5 events per hour and including REM-supine sleep at final pressure) with CPAP but possible incomplete treatment of hypercarbia.     Snoring - was reported as frequent and loud at lower pressures, largely eliminated at higher pressures    Respiratory rate and pattern - was notable for normal respiratory pattern.    Sustained Sleep Associated Hypoventilation - Transcutaneous carbon dioxide monitoring was used. Baseline TCM was 48-49 mmHg with a peak of 65 mmHg early in the study on CPAP 10 cmH20. On Bilevel TCM peaked at 70 mmHg with 21/13 cmH20 and subsequently declined to 60 mmHg on same pressure    Sleep Associated Hypoxemia - (Greater than 5 minutes O2 sat below 89%) was present.  Baseline oxygen saturation was 91.3%. Lowest oxygen saturation was 65.0%.  Time spent less than or equal to 88% was 85 minutes.  Time spent less than or equal to 89% was 101 minutes.     Movement Activity:     Periodic Limb Activity - There were 4 PLMs during the entire study. The PLM index was 0.5 movements per hour.  The PLM Arousal Index was 0 per hour.    REM EMG Activity - Excessive transient / sustained muscle activity was not present.    Nocturnal  Behavior - Abnormal sleep related behaviors were not noted     Bruxism - None apparent.     Cardiac Summary:   The average pulse rate was 84.7 bpm.  The minimum pulse rate was 58.0 bpm while the maximum pulse rate was 128.0 bpm. The rhythm is normal sinus. Arrhythmias were not noted.       Ordered Bilevel 14/8 cm H20. Tier 2     He was laid off because of his work performance 2 months ago  He is frustrated about length of time things are taking    Bedtime 11  Wake time 8-9-10-11    He has difficulty falling asleep most nights because he is not tired  He wakens 1-2 a night  He has trouble falling back to sleep infrequently     These findings were reviewed with patient.         Past medical/surgical history, family history, social history, medications and allergies were reviewed.      Problem List:  Patient Active Problem List    Diagnosis Date Noted     MONA (obstructive sleep apnea) 08/30/2022     Priority: Medium     NAFLD (nonalcoholic fatty liver disease) 01/16/2022     Priority: Medium     ultrasound 12/2021       Transaminitis 11/23/2021     Priority: Medium     Elevated blood pressure reading without diagnosis of hypertension 11/10/2021     Priority: Medium     Type 2 diabetes mellitus without complication, without long-term current use of insulin (H) 09/26/2021     Priority: Medium     Class 3 severe obesity with serious comorbidity and body mass index (BMI) of 45.0 to 49.9 in adult, unspecified obesity type (H) 09/23/2021     Priority: Medium     Dyslipidemia associated with type 2 diabetes mellitus (H) 01/17/2022     Priority: Low     Knee instability, left 01/16/2022     Priority: Low     Diabetes mellitus with proteinuria (H) 11/10/2021     Priority: Low     Nocturia 09/26/2021     Priority: Low        Impression/Plan:    Severe obstructive sleep apnea with long events and profound sleep associated hypoxemia (though oximeter readings were poor)  Complicated titration  - Trial of modafanil to try and help  him get his job back while we are awaiting CPAP machine   - Ordered Bilevel 14/8 cm H20 previously 7/31/22.  Tier 2      Sleep onset difficulties  Recommended later bed times, regular wake time      I spent 40 minutes with patient including counseling, and 5 minutes with chart review, and documentation

## 2022-09-01 ENCOUNTER — TELEPHONE (OUTPATIENT)
Dept: SLEEP MEDICINE | Facility: CLINIC | Age: 27
End: 2022-09-01

## 2022-09-01 DIAGNOSIS — G47.33 OSA (OBSTRUCTIVE SLEEP APNEA): Primary | ICD-10-CM

## 2022-09-01 DIAGNOSIS — E66.01 CLASS 3 SEVERE OBESITY WITH SERIOUS COMORBIDITY AND BODY MASS INDEX (BMI) OF 45.0 TO 49.9 IN ADULT, UNSPECIFIED OBESITY TYPE (H): ICD-10-CM

## 2022-09-01 DIAGNOSIS — E66.813 CLASS 3 SEVERE OBESITY WITH SERIOUS COMORBIDITY AND BODY MASS INDEX (BMI) OF 45.0 TO 49.9 IN ADULT, UNSPECIFIED OBESITY TYPE (H): ICD-10-CM

## 2022-09-01 NOTE — TELEPHONE ENCOUNTER
Dr. Henson authorized patient's Modafinil on 8/30/2022. It looks like patient needs a PA. Please see forms in Media dated 9/1/2022.  Birgit Aggarwal CMA

## 2022-09-02 ENCOUNTER — TELEPHONE (OUTPATIENT)
Dept: SLEEP MEDICINE | Facility: CLINIC | Age: 27
End: 2022-09-02

## 2022-09-02 NOTE — TELEPHONE ENCOUNTER
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

## 2022-09-02 NOTE — TELEPHONE ENCOUNTER
Central Prior Authorization Team   Phone: [phone]      PA Initiation    Medication: modafinil (PROVIGIL) 100 MG tablet--INITIATED  Insurance Company: [employer] - Phone [phone] Fax [phone]  Pharmacy Filling the Rx: [id number] - [place], [place] - [facility] [address] [facility] [address]  Filling Pharmacy Phone: [phone]  Filling Pharmacy Fax:    Start Date: 9/2/2022

## 2022-09-02 NOTE — TELEPHONE ENCOUNTER
Central Prior Authorization Team   Phone: [phone]      Prior Authorization Not Needed per Insurance    Medication: modafinil (PROVIGIL) 100 MG tablet--NOT NEEDED  Insurance Company: [employer] - Phone [phone] Fax [phone]  Expected CoPay:      Pharmacy Filling the Rx: [id number] - [place], [place] - [address] AT [facility] [address]  Pharmacy Notified: Yes  Patient Notified: Yes PHARMACY WILL CONTACT WHEN FILLED

## 2022-09-06 NOTE — NURSING NOTE
Writer has pended a Dipity message for patient. The message states to follow up up after receiving their CPAP Device. It will be sent out on 11/1/2022.

## 2022-09-20 NOTE — TELEPHONE ENCOUNTER
Patient called my direct line for an update. I let him know he is still on the wait list. I also let patient know he can check with local DME providers to see if they have BIPAPs in stock.

## 2022-10-01 ENCOUNTER — HEALTH MAINTENANCE LETTER (OUTPATIENT)
Age: 27
End: 2022-10-01

## 2022-10-25 ENCOUNTER — CARE COORDINATION (OUTPATIENT)
Dept: SLEEP MEDICINE | Facility: CLINIC | Age: 27
End: 2022-10-25

## 2022-10-25 DIAGNOSIS — G47.33 OSA (OBSTRUCTIVE SLEEP APNEA): Primary | Chronic | ICD-10-CM

## 2022-10-25 DIAGNOSIS — E66.813 CLASS 3 SEVERE OBESITY WITH SERIOUS COMORBIDITY AND BODY MASS INDEX (BMI) OF 45.0 TO 49.9 IN ADULT, UNSPECIFIED OBESITY TYPE (H): Chronic | ICD-10-CM

## 2022-10-25 DIAGNOSIS — E66.01 CLASS 3 SEVERE OBESITY WITH SERIOUS COMORBIDITY AND BODY MASS INDEX (BMI) OF 45.0 TO 49.9 IN ADULT, UNSPECIFIED OBESITY TYPE (H): Chronic | ICD-10-CM

## 2022-10-25 NOTE — PROGRESS NOTES
Patient is currently on the waitlist for a Bi-PAP machine. [facility] does have a C2C Bi-Level machines in stock at this time. Please review if they would be a good candidate for a Card to [employer] BiLevel S () device.  Due to the C2C devices not having modems, all downloads, pressure changes, and setting changes will need to be done manually. Patients would need to make an appointment for a download, pressure change, or setting change by calling [facility] at [phone]. If a patient needs continued monitoring by their provider, they would not be a good candidate for this C2C BiLevel device.

## 2022-10-26 NOTE — PROGRESS NOTES
PT GOING TO [facility] FOR BIPAP. PER [facility] COMMUNICATION 9/20/22    Donavan Mayers, Respiratory DME Coordinator

## 2022-11-14 ENCOUNTER — VIRTUAL VISIT (OUTPATIENT)
Dept: SLEEP MEDICINE | Facility: CLINIC | Age: 27
End: 2022-11-14
Payer: COMMERCIAL

## 2022-11-14 VITALS — BODY MASS INDEX: 42.52 KG/M2 | WEIGHT: 240 LBS | HEIGHT: 63 IN

## 2022-11-14 DIAGNOSIS — E66.01 CLASS 3 SEVERE OBESITY WITH SERIOUS COMORBIDITY AND BODY MASS INDEX (BMI) OF 45.0 TO 49.9 IN ADULT, UNSPECIFIED OBESITY TYPE (H): Chronic | ICD-10-CM

## 2022-11-14 DIAGNOSIS — G47.33 OSA (OBSTRUCTIVE SLEEP APNEA): Primary | Chronic | ICD-10-CM

## 2022-11-14 DIAGNOSIS — E66.813 CLASS 3 SEVERE OBESITY WITH SERIOUS COMORBIDITY AND BODY MASS INDEX (BMI) OF 45.0 TO 49.9 IN ADULT, UNSPECIFIED OBESITY TYPE (H): Chronic | ICD-10-CM

## 2022-11-14 PROCEDURE — 99214 OFFICE O/P EST MOD 30 MIN: CPT | Mod: 95 | Performed by: INTERNAL MEDICINE

## 2022-11-14 ASSESSMENT — SLEEP AND FATIGUE QUESTIONNAIRES
HOW LIKELY ARE YOU TO NOD OFF OR FALL ASLEEP WHILE SITTING AND READING: WOULD NEVER DOZE
HOW LIKELY ARE YOU TO NOD OFF OR FALL ASLEEP WHILE WATCHING TV: SLIGHT CHANCE OF DOZING
HOW LIKELY ARE YOU TO NOD OFF OR FALL ASLEEP WHILE LYING DOWN TO REST IN THE AFTERNOON WHEN CIRCUMSTANCES PERMIT: WOULD NEVER DOZE
HOW LIKELY ARE YOU TO NOD OFF OR FALL ASLEEP WHILE SITTING INACTIVE IN A PUBLIC PLACE: WOULD NEVER DOZE
HOW LIKELY ARE YOU TO NOD OFF OR FALL ASLEEP IN A CAR, WHILE STOPPED FOR A FEW MINUTES IN TRAFFIC: WOULD NEVER DOZE
HOW LIKELY ARE YOU TO NOD OFF OR FALL ASLEEP WHEN YOU ARE A PASSENGER IN A CAR FOR AN HOUR WITHOUT A BREAK: WOULD NEVER DOZE
HOW LIKELY ARE YOU TO NOD OFF OR FALL ASLEEP WHILE SITTING AND TALKING TO SOMEONE: WOULD NEVER DOZE
HOW LIKELY ARE YOU TO NOD OFF OR FALL ASLEEP WHILE SITTING QUIETLY AFTER LUNCH WITHOUT ALCOHOL: WOULD NEVER DOZE

## 2022-11-14 ASSESSMENT — PAIN SCALES - GENERAL: PAINLEVEL: NO PAIN (0)

## 2022-11-14 NOTE — PROGRESS NOTES
Sudhir is a 27 year old who is being evaluated via a billable video visit.      How would you like to obtain your AVS? MyChart  If the video visit is dropped, the invitation should be resent by: Send to e-mail at: [email or web address]  Will anyone else be joining your video visit? Shannon Trujillo      Video-Visit Details    Video Start Time: 10:02 AM    Type of service:  Video Visit    Video End Time:10:22 AM    Originating Location (pt. Location): Home        Distant Location (provider location):  Off-site    Platform used for Video Visit: [facility]     Medication Follow-Up Visit:    Chief Complaint   Patient presents with     Video Visit       DME [facility]     Initial sleep study done at the [facility] for socially disruptive snoring, witnessed apneas, sleep maintenance difficulties, paroxysmal nocturnal dyspnea, heart racing at night, reflux at night, nocturia, morning headaches, minimal daytime sleepiness (ESS 5), obesity, tonsillar hypertrophy. Comorbid diabetes mellitus   -  Sleep onset, maintenance difficulties (CALLI 20). 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 He does have some evidence of hyper arousal.      Home Sleep ApneaTest  5/26/2022  (234 lb 3.2 oz).  - Apnea/hypopnea index [AHI] of 51 events per hour.    - AHI was 52 per hour supine, 40 per hour prone, 56 per hour on left side, and n/a per hour on right side.   - Percent of time spent: supine - 76%, prone - 7%, on left - 16%, on right - 0%.  - Time with saturation less than or equal to 88% was 89 minutes. The lowest oxygen saturation was 51%.      Study Date: 7/31/2022 (234.0 lbs)   - The patient was titrated at pressures ranging from 5 cmH2O up to 21/13 cmH2O.  Obstructive apnea events were resolved on 8 cmH20. Supine REM was seen at 13 cmH20 with resolution of events, hypoxemia and TCM 53 mmHg.  Due to elevation of TCM to 59 mmHg patient was placed on bilevel 17/12 mmHg and titrated as high 21/13 mmHg. No  effective pressure was attained on bilevel due to occasional failure to trigger IPAP (likely due to inadequate trigger sensitivity) and development of treatment-related central apneas on higher pressures  - This titration was considered optimal at 13 cmH20 (residual AHI < 5 events per hour and including REM-supine sleep at final pressure) with CPAP but possible incomplete treatment of hypercarbia.   - Baseline TCM was 48-49 mmHg with a peak of 65 mmHg early in the study on CPAP 10 cmH20. On Bilevel TCM peaked at 70 mmHg with 21/13 cmH20 and subsequently declined to 60 mmHg on same pressure  - Lowest oxygen saturation was 65.0%.  Time spent less than or equal to 88% was 85 minutes.  Time spent less than or equal to 89% was 101 minutes.     He was laid off because of his work performance 6/2022    At visit 8/2022 ordered Bilevel 14/8 cm H20, tTrial of modafanil to try and help him get his job back while we are awaiting PAP machine   -  Sleep onset difficulties     He received bilevel pap 2 weeks ago     Doing well/better    He had machine shut off once at night for unclear reasons  Water got in hose one night    He was using modafanil, which was helpful. He stopped when he got the machine  He started a new job 2 weeks ago    Do you use a CPAP Machine at home: Yes  Overall, on a scale of 0-10 how would you rate your CPAP (0 poor, 10 great): 8    What type of mask do you use: Nasal Pillow  Is your mask comfortable: Yes    Is your mask leaking: No    Do you notice snoring with mask on: No  Do you notice gasping arousals with mask on: No  Are you having significant oral or nasal dryness: Yes  Is the pressure setting comfortable: Yes    What is your typical bedtime: 3:30AM to 4:00AM  How long does it take you to go to sleep on PAP therapy: 25 minutes to 30 minutes  What time do you typically get out of bed for the day: 11am to 12pm  How many hours on average per night are you using PAP therapy: 4 to 8 hours  How many hours  are you sleeping per night: 6-8 hours  Do you feel well rested in the morning: Yes      ResMed   Bilevel PAP usage data:Not available     He has not had an STM visit            EPWORTH SLEEPINESS SCALE      [place] Sleepiness Scale ( ITZ Keith  1990-1997[facility]/English - Final version - 21 Nov 07 - [facility] [place].) 11/14/2022   Sitting and reading Would never doze   Watching TV Slight chance of dozing   Sitting, inactive in a public place (e.g. a theatre or a meeting) Would never doze   As a passenger in a car for an hour without a break Would never doze   Lying down to rest in the afternoon when circumstances permit Would never doze   Sitting and talking to someone Would never doze   Sitting quietly after a lunch without alcohol Would never doze   In a car, while stopped for a few minutes in traffic Would never doze   [place] Score (MC) 1   [place] Score (Sleep) 1       INSOMNIA SEVERITY INDEX (CALLI)      Insomnia Severity Index (CALLI) 11/14/2022   Difficulty falling asleep 2   Difficulty staying asleep 1   Problems waking up too early 1   How SATISFIED/DISSATISFIED are you with your CURRENT sleep pattern? 1   How NOTICEABLE to others do you think your sleep problem is in terms of impairing the quality of your life? 1   How WORRIED/DISTRESSED are you about your current sleep problem? 1   To what extent do you consider your sleep problem to INTERFERE with your daily functioning (e.g. daytime fatigue, mood, ability to function at work/daily chores, concentration, memory, mood, etc.) CURRENTLY? 1   CALLI Total Score 8       Guidelines for Scoring/Interpretation:  Total score categories:  0-7 = No clinically significant insomnia   8-14 = Subthreshold insomnia   15-21 = Clinical insomnia (moderate severity)  22-28 = Clinical insomnia (severe)  Used via courtesy of www.[place].gov with permission from Vlad Mendieta PhD., [facility]        Past medical/surgical history, family history, social history,  "medications and allergies were reviewed.      Problem List:  Patient Active Problem List    Diagnosis Date Noted     MONA (obstructive sleep apnea)- severe (AHI 51) 08/30/2022     Priority: Medium     Home Sleep ApneaTest  5/26/2022  (234 lb) - Apnea/hypopnea index [AHI] of 51 events per hour.  AHI was 52 per hour supine, 40 per hour prone, 56 per hour on left side, and n/a per hour on right side. Percent of time spent: supine - 76%, prone - 7%, on left - 16%, on right - 0%. Time with saturation less than or equal to 88% was 89 minutes. The lowest oxygen saturation was 51%.        NAFLD (nonalcoholic fatty liver disease) 01/16/2022     Priority: Medium     ultrasound 12/2021       Transaminitis 11/23/2021     Priority: Medium     Elevated blood pressure reading without diagnosis of hypertension 11/10/2021     Priority: Medium     Type 2 diabetes mellitus without complication, without long-term current use of insulin (H) 09/26/2021     Priority: Medium     Class 3 severe obesity with serious comorbidity and body mass index (BMI) of 45.0 to 49.9 in adult, unspecified obesity type (H) 09/23/2021     Priority: Medium     Dyslipidemia associated with type 2 diabetes mellitus (H) 01/17/2022     Priority: Low     Knee instability, left 01/16/2022     Priority: Low     Diabetes mellitus with proteinuria (H) 11/10/2021     Priority: Low     Nocturia 09/26/2021     Priority: Low        Ht 1.6 m (5' 3\")   Wt 108.9 kg (240 lb)   BMI 42.51 kg/m      Impression/Plan:     Severe obstructive sleep apnea with lprofound sleep associated hypoxemia (though oximeter readings were poor) and hypoventilation  - [facility] referral  - Get download off machine ([employer])  - Find out if machine has modem, if it does then have Aubrey added to Airview.   - Check WatchPAT on PAP once AHI good    Shift work   - Coping recommendations discussed  - Stop modafanil for now    Sudhir Her will follow up in about 3 month(s).     I spent 10 minutes with " patient including counseling, and 15 minutes with chart review, and documentation

## 2022-11-14 NOTE — Clinical Note
- Get download off machine ([facility]) - Find out if machine has modem, if it does then have Stearns added to Airview.  - Will check WatchPAT on PAP once AHI good (await download)

## 2022-11-27 NOTE — PROGRESS NOTES
RESMED   Bilevel PAP  14/8 download:    Average use 4 hours 2 minutes per day.  10 total days of use.  7 days with >4 hours use.   Median Leak 9 L/min. 95%ile Leak 24 L/min.   AHI 11.6.   AI 10.2  OAI 8.0    Recommend increase pressures to 16/10  Recheck download in 2 -3 weeks  Uses [facility]

## 2022-12-02 ENCOUNTER — DOCUMENTATION ONLY (OUTPATIENT)
Dept: SLEEP MEDICINE | Facility: CLINIC | Age: 27
End: 2022-12-02
Payer: COMMERCIAL

## 2022-12-02 DIAGNOSIS — E66.813 CLASS 3 SEVERE OBESITY WITH SERIOUS COMORBIDITY AND BODY MASS INDEX (BMI) OF 45.0 TO 49.9 IN ADULT, UNSPECIFIED OBESITY TYPE (H): Chronic | ICD-10-CM

## 2022-12-02 DIAGNOSIS — E66.01 CLASS 3 SEVERE OBESITY WITH SERIOUS COMORBIDITY AND BODY MASS INDEX (BMI) OF 45.0 TO 49.9 IN ADULT, UNSPECIFIED OBESITY TYPE (H): Chronic | ICD-10-CM

## 2022-12-02 DIAGNOSIS — G47.33 OSA (OBSTRUCTIVE SLEEP APNEA): Primary | Chronic | ICD-10-CM

## 2022-12-02 NOTE — PROGRESS NOTES
STM Recheck:  Patient has used CPAP fours or greater the last 7 days.  Will recheck in two weeks.

## 2022-12-10 LAB — RETINOPATHY: NORMAL

## 2022-12-13 ENCOUNTER — TELEPHONE (OUTPATIENT)
Dept: SLEEP MEDICINE | Facility: CLINIC | Age: 27
End: 2022-12-13

## 2022-12-13 DIAGNOSIS — G47.33 OSA (OBSTRUCTIVE SLEEP APNEA): Primary | Chronic | ICD-10-CM

## 2022-12-13 DIAGNOSIS — E66.01 CLASS 3 SEVERE OBESITY WITH SERIOUS COMORBIDITY AND BODY MASS INDEX (BMI) OF 45.0 TO 49.9 IN ADULT, UNSPECIFIED OBESITY TYPE (H): Chronic | ICD-10-CM

## 2022-12-13 DIAGNOSIS — E66.813 CLASS 3 SEVERE OBESITY WITH SERIOUS COMORBIDITY AND BODY MASS INDEX (BMI) OF 45.0 TO 49.9 IN ADULT, UNSPECIFIED OBESITY TYPE (H): Chronic | ICD-10-CM

## 2022-12-13 NOTE — TELEPHONE ENCOUNTER
Unable to see any information on Resmed website    Please have patient get us a download from [facility] to re-evaluate after recent pressure change

## 2022-12-14 ENCOUNTER — DOCUMENTATION ONLY (OUTPATIENT)
Dept: SLEEP MEDICINE | Facility: CLINIC | Age: 27
End: 2022-12-14
Payer: COMMERCIAL

## 2022-12-14 DIAGNOSIS — E66.813 CLASS 3 SEVERE OBESITY WITH SERIOUS COMORBIDITY AND BODY MASS INDEX (BMI) OF 45.0 TO 49.9 IN ADULT, UNSPECIFIED OBESITY TYPE (H): Chronic | ICD-10-CM

## 2022-12-14 DIAGNOSIS — G47.33 OSA (OBSTRUCTIVE SLEEP APNEA): Primary | Chronic | ICD-10-CM

## 2022-12-14 DIAGNOSIS — E66.01 CLASS 3 SEVERE OBESITY WITH SERIOUS COMORBIDITY AND BODY MASS INDEX (BMI) OF 45.0 TO 49.9 IN ADULT, UNSPECIFIED OBESITY TYPE (H): Chronic | ICD-10-CM

## 2022-12-15 ENCOUNTER — OFFICE VISIT (OUTPATIENT)
Dept: FAMILY MEDICINE | Facility: CLINIC | Age: 27
End: 2022-12-15
Payer: COMMERCIAL

## 2022-12-15 VITALS
TEMPERATURE: 97.6 F | BODY MASS INDEX: 44.61 KG/M2 | DIASTOLIC BLOOD PRESSURE: 85 MMHG | HEART RATE: 77 BPM | WEIGHT: 242.4 LBS | SYSTOLIC BLOOD PRESSURE: 132 MMHG | OXYGEN SATURATION: 96 % | HEIGHT: 62 IN

## 2022-12-15 DIAGNOSIS — R80.9 DIABETES MELLITUS WITH PROTEINURIA (H): Primary | ICD-10-CM

## 2022-12-15 DIAGNOSIS — E11.29 DIABETES MELLITUS WITH PROTEINURIA (H): Primary | ICD-10-CM

## 2022-12-15 DIAGNOSIS — Z79.899 ON ANGIOTENSIN-CONVERTING ENZYME (ACE) INHIBITORS: ICD-10-CM

## 2022-12-15 DIAGNOSIS — Z23 NEED FOR PROPHYLACTIC VACCINATION AND INOCULATION AGAINST INFLUENZA: ICD-10-CM

## 2022-12-15 DIAGNOSIS — M25.362 KNEE INSTABILITY, LEFT: ICD-10-CM

## 2022-12-15 LAB
ALBUMIN SERPL BCG-MCNC: 4.8 G/DL (ref 3.5–5.2)
ALP SERPL-CCNC: 63 U/L (ref 40–129)
ALT SERPL W P-5'-P-CCNC: 99 U/L (ref 10–50)
ANION GAP SERPL CALCULATED.3IONS-SCNC: 12 MMOL/L (ref 7–15)
AST SERPL W P-5'-P-CCNC: 44 U/L (ref 10–50)
BILIRUB DIRECT SERPL-MCNC: <0.2 MG/DL (ref 0–0.3)
BILIRUB SERPL-MCNC: 0.5 MG/DL
BUN SERPL-MCNC: 12.1 MG/DL (ref 6–20)
CALCIUM SERPL-MCNC: 9.2 MG/DL (ref 8.6–10)
CHLORIDE SERPL-SCNC: 103 MMOL/L (ref 98–107)
CHOLEST SERPL-MCNC: 223 MG/DL
CREAT SERPL-MCNC: 0.68 MG/DL (ref 0.67–1.17)
CREAT UR-MCNC: 259 MG/DL
DEPRECATED HCO3 PLAS-SCNC: 24 MMOL/L (ref 22–29)
GFR SERPL CREATININE-BSD FRML MDRD: >90 ML/MIN/1.73M2
GLUCOSE SERPL-MCNC: 118 MG/DL (ref 70–99)
HBA1C MFR BLD: 7.5 % (ref 0–5.6)
HDLC SERPL-MCNC: 39 MG/DL
LDLC SERPL CALC-MCNC: 166 MG/DL
MICROALBUMIN UR-MCNC: 438 MG/L
MICROALBUMIN/CREAT UR: 169.11 MG/G CR (ref 0–17)
NONHDLC SERPL-MCNC: 184 MG/DL
POTASSIUM SERPL-SCNC: 4 MMOL/L (ref 3.4–5.3)
PROT SERPL-MCNC: 7.7 G/DL (ref 6.4–8.3)
SODIUM SERPL-SCNC: 139 MMOL/L (ref 136–145)
TRIGL SERPL-MCNC: 91 MG/DL

## 2022-12-15 PROCEDURE — 83036 HEMOGLOBIN GLYCOSYLATED A1C: CPT | Performed by: STUDENT IN AN ORGANIZED HEALTH CARE EDUCATION/TRAINING PROGRAM

## 2022-12-15 PROCEDURE — 36415 COLL VENOUS BLD VENIPUNCTURE: CPT | Performed by: STUDENT IN AN ORGANIZED HEALTH CARE EDUCATION/TRAINING PROGRAM

## 2022-12-15 PROCEDURE — 90677 PCV20 VACCINE IM: CPT | Performed by: STUDENT IN AN ORGANIZED HEALTH CARE EDUCATION/TRAINING PROGRAM

## 2022-12-15 PROCEDURE — 90471 IMMUNIZATION ADMIN: CPT | Performed by: STUDENT IN AN ORGANIZED HEALTH CARE EDUCATION/TRAINING PROGRAM

## 2022-12-15 PROCEDURE — 91313 COVID-19 VACCINE BIVALENT BOOSTER 18+ (MODERNA): CPT | Performed by: STUDENT IN AN ORGANIZED HEALTH CARE EDUCATION/TRAINING PROGRAM

## 2022-12-15 PROCEDURE — 80061 LIPID PANEL: CPT | Performed by: STUDENT IN AN ORGANIZED HEALTH CARE EDUCATION/TRAINING PROGRAM

## 2022-12-15 PROCEDURE — 99214 OFFICE O/P EST MOD 30 MIN: CPT | Mod: 25 | Performed by: STUDENT IN AN ORGANIZED HEALTH CARE EDUCATION/TRAINING PROGRAM

## 2022-12-15 PROCEDURE — 80053 COMPREHEN METABOLIC PANEL: CPT | Performed by: STUDENT IN AN ORGANIZED HEALTH CARE EDUCATION/TRAINING PROGRAM

## 2022-12-15 PROCEDURE — 82248 BILIRUBIN DIRECT: CPT | Performed by: STUDENT IN AN ORGANIZED HEALTH CARE EDUCATION/TRAINING PROGRAM

## 2022-12-15 PROCEDURE — 90686 IIV4 VACC NO PRSV 0.5 ML IM: CPT | Performed by: STUDENT IN AN ORGANIZED HEALTH CARE EDUCATION/TRAINING PROGRAM

## 2022-12-15 PROCEDURE — 90472 IMMUNIZATION ADMIN EACH ADD: CPT | Performed by: STUDENT IN AN ORGANIZED HEALTH CARE EDUCATION/TRAINING PROGRAM

## 2022-12-15 PROCEDURE — 82043 UR ALBUMIN QUANTITATIVE: CPT | Performed by: STUDENT IN AN ORGANIZED HEALTH CARE EDUCATION/TRAINING PROGRAM

## 2022-12-15 PROCEDURE — 0134A COVID-19 VACCINE BIVALENT BOOSTER 18+ (MODERNA): CPT | Performed by: STUDENT IN AN ORGANIZED HEALTH CARE EDUCATION/TRAINING PROGRAM

## 2022-12-15 RX ORDER — LISINOPRIL 5 MG/1
10 TABLET ORAL DAILY
Qty: 90 TABLET | Refills: 1 | Status: SHIPPED | OUTPATIENT
Start: 2022-12-15 | End: 2023-03-16

## 2022-12-15 NOTE — PROGRESS NOTES
"  Assessment & Plan     Diabetes mellitus with proteinuria (H)  Last A1c 8 near 1 year ago. Long hiatus from care. No home BS checks. At least no symptoms of hyper or hypoglycemia. Had been taking meds but ran out a few months ago and hasn't come in until today.   The five goals of the D5 include:  1. Control blood pressure - at goal   2. Lower bad cholesterol - LDL very high in past. Declined statin. Recheck today.   3. Maintain blood sugar - as elsewhere.   4. Be tobacco-free - yes, not smoking.   5. Take aspirin as recommended - likely not recommended.   - Hemoglobin A1c  - Albumin Random Urine Quantitative with Creat Ratio  - Lipid Profile  - Hepatic function panel  - Refill: lisinopril (ZESTRIL) 5 MG tablet; Take 2 tablets (10 mg) by mouth daily  Dispense: 90 tablet; Refill: 1  - Refill: metFORMIN (GLUCOPHAGE) 500 MG tablet; Take 1 tablet (500 mg) by mouth 2 times daily (with meals) Start by taking one tablet once daily with meals. After 1 week, build to 1 tablet in the morning and 1 tablet in the evening daily.  Dispense: 30 tablet; Refill: 1  - Discussed if labs come back with contraindication to refills, we will call him and notify   - He did see the eye doc -- asked him to send the records over     On angiotensin-converting enzyme (ACE) inhibitors  Considerable proteinuria, improved when he was adherent to lisinopril.   - Albumin Random Urine Quantitative with Creat Ratio  - Basic metabolic panel    Need for prophylactic vaccination and inoculation against influenza  - INFLUENZA VACCINE IM > 6 MONTHS VALENT IIV4 (AFLURIA/FLUZONE)    Knee instability, left  Stable but could be optimized. Wants to meet with PT.   - Physical Therapy Referral; Future     BMI:   Estimated body mass index is 44.61 kg/m  as calculated from the following:    Height as of this encounter: 1.57 m (5' 1.81\").    Weight as of this encounter: 110 kg (242 lb 6.4 oz).   Discussed diet as above   Would be ideal to have him meet with weight " "loss clinic at some point     Jeanine Edmond DO  [facility] [place]    Precepted with Dr. Anthony Alvarez   Sudhir is a 27 year old M hx of T2DM presenting for the following health issues:  RECHECK (A1C), Imm/Inj, URI (Covid and flu), Refill Request, and Imm/Inj (Flu Shot)    HPI     Diabetes recheck    A1c 8 in 1/2022   Any home sugar checks? None    Physically feeling better  -- going to gym more  His left knee has continued to bother him and wonders about what he can do to fix that. He wants to run and sees this as a barrier to his health    Not having increased thirst, urination, abdominal pain, chest pain, infections, rashes, etc   Meds:   Metformin 500 bid -- ran out 2 months   Just started a new job -- was without insurance for a few months so that is why he did not see us     *Also, proteinuria -- adherent to lisinopril? Out for 2 months     Vaccines: agreeable to these      Review of Systems   Constitutional, HEENT, cardiovascular, pulmonary, GI, , musculoskeletal, neuro, skin, endocrine and psych systems are negative, except as otherwise noted.      Objective    /85   Pulse 77   Temp 97.6  F (36.4  C) (Tympanic)   Ht 1.57 m (5' 1.81\")   Wt 110 kg (242 lb 6.4 oz)   SpO2 96%   BMI 44.61 kg/m    Body mass index is 44.61 kg/m .  Physical Exam     Gen: Pleasant. No distress.    HEENT: MMM.   Neck: No overt asymmetry.   CV: Appears well-perfused. RRR. No murmur.   Resp: Breathing comfortably on room air. Lungs clear to auscultation bilaterally without wheeze or crackle.   Abd: morbidly obese.  Non-distended   Ext: No edema. Left knee without effusion. Strength and sensation intact. Ligaments intact. His knee deviates when he does a one legged squat.   Skin: No overt rash on easily visualized skin.   Neuro: Non-focal.   Psych: Calm.         "

## 2022-12-15 NOTE — PROGRESS NOTES
Preceptor Attestation:   Patient seen, evaluated and discussed with the resident. I have verified the content of the note, which accurately reflects my assessment of the patient and the plan of care.    Supervising Physician:Maritza Cruz MD    [facility]

## 2022-12-21 NOTE — TELEPHONE ENCOUNTER
Bilevel PAP [id number]/21-12/20/22  Patient is on a Vauto min EPAP 8, PS 4, Max IPAP 14 Not a bipap which is what was ordered  Average use 5 hours 43 minutes per day.  27 total days of use.  22/27 days with >4 hours use.   Median Leak 9 L/min. 95%ile Leak 30 L/min. AHI 7.3.       The pressures were not change as previously instructed   Please contact patients DME vendor and find out why  Change settings to BIPAP 16/10 or Vauto min EPAP 10. PS 6, Max IPAP 16   Repeat download in 30 days  He needs an oximetry on treatment at some point

## 2022-12-23 ENCOUNTER — DOCUMENTATION ONLY (OUTPATIENT)
Dept: SLEEP MEDICINE | Facility: CLINIC | Age: 27
End: 2022-12-23
Payer: COMMERCIAL

## 2022-12-23 DIAGNOSIS — E66.813 CLASS 3 SEVERE OBESITY WITH SERIOUS COMORBIDITY AND BODY MASS INDEX (BMI) OF 45.0 TO 49.9 IN ADULT, UNSPECIFIED OBESITY TYPE (H): Chronic | ICD-10-CM

## 2022-12-23 DIAGNOSIS — G47.33 OSA (OBSTRUCTIVE SLEEP APNEA): Primary | Chronic | ICD-10-CM

## 2022-12-23 DIAGNOSIS — E66.01 CLASS 3 SEVERE OBESITY WITH SERIOUS COMORBIDITY AND BODY MASS INDEX (BMI) OF 45.0 TO 49.9 IN ADULT, UNSPECIFIED OBESITY TYPE (H): Chronic | ICD-10-CM

## 2022-12-23 NOTE — PROGRESS NOTES
Spoke with patient he was at the gym he was difficult to hear.  He was wondering if the pressure was changed.  He stated he thinks Resmed My Air is not reporting the correct data.  He will call me next week.

## 2023-01-06 ENCOUNTER — HOSPITAL ENCOUNTER (OUTPATIENT)
Dept: PHYSICAL THERAPY | Facility: REHABILITATION | Age: 28
Discharge: HOME | End: 2023-01-06
Attending: FAMILY MEDICINE
Payer: COMMERCIAL

## 2023-01-06 DIAGNOSIS — M25.362 KNEE INSTABILITY, LEFT: ICD-10-CM

## 2023-01-06 PROCEDURE — 97112 NEUROMUSCULAR REEDUCATION: CPT | Mod: GP | Performed by: PHYSICAL THERAPIST

## 2023-01-06 PROCEDURE — 97162 PT EVAL MOD COMPLEX 30 MIN: CPT | Mod: GP | Performed by: PHYSICAL THERAPIST

## 2023-01-06 NOTE — PROGRESS NOTES
"   01/06/23 1127   General Information   Type of Visit Initial OP Ortho PT Evaluation   Start of Care Date 01/06/23   Referring Physician Maritza Cruz MD   Orders Evaluate and Treat   Date of Order 12/15/22   Certification Required? No   Medical Diagnosis Knee Instability, Left   Surgical/Medical history reviewed Yes   Body Part(s)   Body Part(s) Knee   Presentation and Etiology   Pertinent history of current problem (include personal factors and/or comorbidities that impact the POC) Sudhir relates that he was longboard skateboarding down a hill and a little dog came running at him, he doged and jumped the dog, rolling out of it on the road. He couldn't get up. had to go to the ER. He thinks maybe he landed on his knee, or did the splits before he fell. MRI and X-rays were negative. They gave him a brace anyway and crutches. He coudn't put pressure on it. The brace didn't let him bend his knee and he wore it for 3-4 weeks,which made drivign difficult. He was given the brace in the ER and did not have any MD visits regarding his knee after this ER visit. He was told to at ER discharge but never did. He is at 235 pounds now and wants to get down to 210. He weighed about 140 in high school. Over the past several years he was diagnosed with DMII and maybe also sleep apnea. He's lost 25 pounds recently, so he has been doing 2 hours of gym each day. 40 minutes of walking incline and then weights, but he avoids squats adn things like that put a lot of pressure on his knees. He says that it took him 1.5 years to get his health stuff sorted out. He says that he feels as though his left knee is unstable and demonstrates an apparent pivot shift with standing flexion and extension. At time of injury, the knee swelled up to 'twice\" it's normal size.   Impairments A. Pain;C. Swelling;B. Decreased WB tolerance;H. Impaired gait   Functional Limitations perform activities of daily living;perform desired leisure / sports " activities;perform required work activities   Symptom Location L knee   How/Where did it occur With a fall   Onset date of current episode/exacerbation 06/22/22   Chronicity Chronic   Pain rating (0-10 point scale) Worst (/10)   Worst (/10) 4   Fall Risk Screen   Fall screen completed by PT   Have you fallen 2 or more times in the past year? No   Have you fallen and had an injury in the past year? No   Is patient a fall risk? No   Abuse Screen (yes response referral indicated)   Feels Unsafe at Home or Work/School no   Feels Threatened by Someone no   Does Anyone Try to Keep You From Having Contact with Others or Doing Things Outside Your Home? no   Physical Signs of Abuse Present no   System Outcome Measures   Outcome Measures   (LEFS 60/80)   Knee Objective Findings   Gait/Locomotion Grossly normal walking gait; L foot turned in beyond midline.   Knee/Hip Strength Comments All hip and knee MMT WNL but some pain evoked with resisted L knee extension   Observation Active movements; Demonstrates very deep squat with L>R DF, both sides exhibiting active range >50 deg. No pain. Lunges grossly normal. Demonstrates bilateral squat with R pelvic rotation, such that L knee adducts toward midline, which produces a visible medial shift suggestive of instability.   Anterior Drawer Test POSITIVE   Side (if bilateral, select both right and left) Right   Knee Special Test Comments PIVOT SHIFT POSITIVE   Planned Therapy Interventions   Planned Therapy Interventions strengthening;gait training   Clinical Impression   Criteria for Skilled Therapeutic Interventions Met yes, treatment indicated   PT Diagnosis L knee instability (ACL)   Influenced by the following impairments L knee instability, pain   Functional limitations due to impairments all functional mobility   Clinical Presentation Stable/Uncomplicated   Clinical Presentation Rationale presents as diagnosed   Clinical Decision Making (Complexity) Moderate complexity   Therapy  Frequency 1 time/week   Predicted Duration of Therapy Intervention (days/wks) 12 weeks   Risk & Benefits of therapy have been explained Yes   Patient, Family & other staff in agreement with plan of care Yes   Clinical Impression Comments Patient referred to physical therapy by physician with diagnosis of L knee instability. On clinical testing today, patient's left knee was positive in both anterior drawer and pivot shift test, suggesting a high probabily of anterior cruciate injury or rupture. Patient referred back to primary care. Magnetic resoance imaging and referral to orthopedics recommended.   ORTHO GOALS   PT Ortho Eval Goals 1;2;3   Ortho Goal 1   Goal Identifier Consult with primary care regarding clinical testing   Goal Description Patient will consult with primary care physicain regarding positive ACL testing.   Ortho Goal 2   Goal Identifier LEFS   Goal Description Patient will score 80/80 on LEFS to demonstrate global recovery of knee function.   Total Evaluation Time   PT Thuy, Moderate Complexity Minutes (84858) 15

## 2023-01-12 ENCOUNTER — DOCUMENTATION ONLY (OUTPATIENT)
Dept: SLEEP MEDICINE | Facility: CLINIC | Age: 28
End: 2023-01-12
Payer: COMMERCIAL

## 2023-01-12 NOTE — PROGRESS NOTES
STM Recheck:  Called patient and his phone kept ringing no voicemail. Sent patient a SlideMail message.

## 2023-01-26 ENCOUNTER — TELEPHONE (OUTPATIENT)
Dept: SLEEP MEDICINE | Facility: CLINIC | Age: 28
End: 2023-01-26
Payer: COMMERCIAL

## 2023-01-26 DIAGNOSIS — G47.33 OSA (OBSTRUCTIVE SLEEP APNEA): Primary | Chronic | ICD-10-CM

## 2023-01-26 NOTE — TELEPHONE ENCOUNTER
Accessed patients Airview via the outside vendors account. Uploaded 3 day detailed and 30 day compliant download to Epic media. Patient had pressure change completed on 12/21/2022 for settings on order from 11/14/2022.

## 2023-01-26 NOTE — TELEPHONE ENCOUNTER
I went to [facility] on download on Airview to see if his pressures were changed and if his AHI was better, but there was no data.    Can you please see what is going on and get a manual download if needed....    Do we need to have [facility] add us in Airview?

## 2023-01-29 NOTE — TELEPHONE ENCOUNTER
Download reviewed   EPAP min 10, IPAP max 16  Pressure support set at 4, was supposed to be 6      AHI looks good, but average usage is under 6 hours a night  Recommend watchPAT on PAP to make sure oxygen levels are good   Orders placed, follow up appointment not needed

## 2023-01-31 NOTE — TELEPHONE ENCOUNTER
Please schedule Watchpat on pap. No follow up appointment needed.     Thank you,     Emely BIANCHI RN  [facility]

## 2023-02-04 ENCOUNTER — HEALTH MAINTENANCE LETTER (OUTPATIENT)
Age: 28
End: 2023-02-04

## 2023-02-08 DIAGNOSIS — E11.29 DIABETES MELLITUS WITH PROTEINURIA (H): ICD-10-CM

## 2023-02-08 DIAGNOSIS — R80.9 DIABETES MELLITUS WITH PROTEINURIA (H): ICD-10-CM

## 2023-02-08 DIAGNOSIS — E11.9 TYPE 2 DIABETES MELLITUS WITHOUT COMPLICATION, WITHOUT LONG-TERM CURRENT USE OF INSULIN (H): ICD-10-CM

## 2023-02-21 ENCOUNTER — OFFICE VISIT (OUTPATIENT)
Dept: FAMILY MEDICINE | Facility: CLINIC | Age: 28
End: 2023-02-21
Payer: COMMERCIAL

## 2023-02-21 VITALS
DIASTOLIC BLOOD PRESSURE: 83 MMHG | WEIGHT: 227 LBS | SYSTOLIC BLOOD PRESSURE: 128 MMHG | BODY MASS INDEX: 41.77 KG/M2 | OXYGEN SATURATION: 98 % | RESPIRATION RATE: 18 BRPM | HEART RATE: 74 BPM

## 2023-02-21 DIAGNOSIS — E11.29 DIABETES MELLITUS WITH PROTEINURIA (H): ICD-10-CM

## 2023-02-21 DIAGNOSIS — M25.562 ANTERIOR KNEE PAIN, LEFT: Primary | ICD-10-CM

## 2023-02-21 DIAGNOSIS — R80.9 DIABETES MELLITUS WITH PROTEINURIA (H): ICD-10-CM

## 2023-02-21 PROCEDURE — 99214 OFFICE O/P EST MOD 30 MIN: CPT | Mod: GC | Performed by: STUDENT IN AN ORGANIZED HEALTH CARE EDUCATION/TRAINING PROGRAM

## 2023-02-21 RX ORDER — LISINOPRIL 5 MG/1
10 TABLET ORAL DAILY
Qty: 90 TABLET | Refills: 1 | Status: CANCELLED | OUTPATIENT
Start: 2023-02-21

## 2023-02-21 RX ORDER — LISINOPRIL 10 MG/1
10 TABLET ORAL DAILY
Qty: 90 TABLET | Refills: 1 | Status: SHIPPED | OUTPATIENT
Start: 2023-02-21 | End: 2023-03-17

## 2023-02-21 NOTE — PROGRESS NOTES
Preceptor Attestation:   Patient seen, evaluated and discussed with the resident. I have verified the content of the note, which accurately reflects my assessment of the patient and the plan of care.  Supervising Physician:Val Callejas MD  [facility]

## 2023-02-21 NOTE — PROGRESS NOTES
Assessment & Plan     (M24.560) Anterior knee pain, left  (primary encounter diagnosis)  Comment: uncontrolled. anterior chronic pain following traumatic injury 2021 and heavy load with BMI. Feels unstable, mechanical symptoms. Exam today for positive Lachmans on left. R/o ACL injury.   Plan:  - Re-emphasized importance of weight loss   - MRI ordered, caution with running / cutting until result     - Consider orthopedic surgery referral pending result   - Discussed importance of ongoing PT (prehab in event of surgery) and home exercises   - Discussed returning when the image is obtained -- can be by phone     (E11.29,  R80.9) Diabetes mellitus with proteinuria (H)  Comment: above goal <7 but improved! On metformin and lisinopril; requesting refill.  Plan:  - Refill metFORMIN (GLUCOPHAGE) 1000 MG tablet & lisinopril (ZESTRIL) 10 MG tablet  - Reasonable to rescreen proteinuria in next few months   - Consider initiating GLP-1 at follow up visit  - Recheck A1c on or after 3/15/22     Follow up in 3 weeks over phone visit    Medical student: MS Romero-3    I was present with the medical student who participated in the service and in the documentation of the note. I have verified the history and personally performed the physical exam and medical decision making, and have verified the content of the note, which accurately reflects my assessment of the patient and the plan of care    Dino Edmond DO  [facility] Medicine Resident   Pager #: [phone]    Precepted with Dr. Britta Alvarez   Sudhir is a 27 year old, presenting for the following health issues:  RECHECK (Follow up medication. ), Medication Reconciliation (Pt wants to know if DM supplies is paid out of pocket/), and Refill Request (Longer quantity )  Anterior left knee pain    HPI      Sustained a fall in 2021 while longboarding. He was going downhill when a unleashed dog came up in front of him. He used his left foot to stop quickly  and it dragged backwards before tumbling down. He does not recall hearing a pop but his left knee got swollen right away. He couldn't ambulate so he went to ER, and got Xrays that ruled out any fractures. He was given a brace and was resting for a few weeks. He saw PT in January 2023 due to continued pain and there was concern for ACL pathology. He feels the pain mostly anteromedially with running for longer distances or with jumping/squatting. He describes some instability in the knee as well as some mechanical symptoms like popping. He is trying to be active at the gym but he feels like the knee is getting in his way.    Review of Systems   Constitutional, HEENT, cardiovascular, pulmonary, gi and gu systems are negative, except as otherwise noted.      Objective    /83 (BP Location: Right arm, Patient Position: Sitting, Cuff Size: Adult Large)   Pulse 74   Resp 18   Wt 103 kg (227 lb)   SpO2 98%   BMI 41.77 kg/m    Body mass index is 41.77 kg/m .  Physical Exam     Gen: Pleasant. No distress.    HEENT: MMM.   Neck: No overt asymmetry.   CV: Appears well-perfused.   Resp: Breathing comfortably on room air.   Abd: Obese. Non-distended  Ext: No edema or overt asymmetry/deformity.   Skin: No overt rash on easily visualized skin.   Neuro: Non-focal.   Psych: Calm.     MS: Left knee:   Mild swelling to left knee though no overt effusion   Tenderness to medial joint line   Strength sensation nml and symmetric   Special tests    +lachmans on left (relative to right)    Pain with thessaly with deep squat    No laxity to valgus/varus stress.    Negative posterior drawer.    Negative [facility].

## 2023-02-27 NOTE — PROGRESS NOTES
[facility]    OUTPATIENT PHYSICAL THERAPY ORTHOPEDIC EVALUATION  PLAN OF TREATMENT FOR OUTPATIENT REHABILITATION  (COMPLETE FOR INITIAL CLAIMS ONLY)  Patient's Last Name, First Name, M.I.  YOB: 1995  Her,Sudhir       Provider s Name:  [facility]   Medical Record No.  [id number]   Start of Care Date:  01/06/23   Onset Date:  06/22/22   Type:     _X__PT   ___OT   ___SLP Medical Diagnosis:  (P) Left Knee Instability     PT Diagnosis:  L knee instability (ACL)   Visits from SOC:  1      _________________________________________________________________________________  Plan of Treatment/Functional Goals:  strengthening, gait training           Goals  Goal Identifier: Consult with primary care regarding clinical testing  Goal Description: Patient will consult with primary care physicain regarding positive ACL testing.       Goal Identifier: LEFS  Goal Description: Patient will score 80/80 on LEFS to demonstrate global recovery of knee function.       01/06/23 1127   General Information   Type of Visit Initial OP Ortho PT Evaluation   Start of Care Date 01/06/23   Referring Physician Maritza Cruz MD   Orders Evaluate and Treat   Date of Order 12/15/22   Certification Required? Yes   Medical Diagnosis Knee Instability, Left   Surgical/Medical history reviewed Yes   Body Part(s)   Body Part(s) Knee   Presentation and Etiology   Pertinent history of current problem (include personal factors and/or comorbidities that impact the POC) Sudhir relates that he was longboard skateboarding down a hill and a little dog came running at him, he doged and jumped the dog, rolling out of it on the road. He couldn't get up. had to go to the ER. He thinks maybe he landed on his knee, or did the splits before he fell. MRI and X-rays were negative. They gave him a brace anyway and crutches.  "He coudn't put pressure on it. The brace didn't let him bend his knee and he wore it for 3-4 weeks,which made drivign difficult. He was given the brace in the ER and did not have any MD visits regarding his knee after this ER visit. He was told to at ER discharge but never did. He is at 235 pounds now and wants to get down to 210. He weighed about 140 in high school. Over the past several years he was diagnosed with DMII and maybe also sleep apnea. He's lost 25 pounds recently, so he has been doing 2 hours of gym each day. 40 minutes of walking incline and then weights, but he avoids squats adn things like that put a lot of pressure on his knees. He says that it took him 1.5 years to get his health stuff sorted out. He says that he feels as though his left knee is unstable and demonstrates an apparent pivot shift with standing flexion and extension. At time of injury, the knee swelled up to 'twice\" it's normal size.   Impairments A. Pain;C. Swelling;B. Decreased WB tolerance;H. Impaired gait   Functional Limitations perform activities of daily living;perform desired leisure / sports activities;perform required work activities   Symptom Location L knee   How/Where did it occur With a fall   Onset date of current episode/exacerbation 06/22/22   Chronicity Chronic   Pain rating (0-10 point scale) Worst (/10)   Worst (/10) 4   Fall Risk Screen   Fall screen completed by PT   Have you fallen 2 or more times in the past year? No   Have you fallen and had an injury in the past year? No   Is patient a fall risk? No   Abuse Screen (yes response referral indicated)   Feels Unsafe at Home or Work/School no   Feels Threatened by Someone no   Does Anyone Try to Keep You From Having Contact with Others or Doing Things Outside Your Home? no   Physical Signs of Abuse Present no   System Outcome Measures   Outcome Measures   (LEFS 60/80)   Knee Objective Findings   Gait/Locomotion Grossly normal walking gait; L foot turned in beyond " midline.   Knee/Hip Strength Comments All hip and knee MMT WNL but some pain evoked with resisted L knee extension   Observation Active movements; Demonstrates very deep squat with L>R DF, both sides exhibiting active range >50 deg. No pain. Lunges grossly normal. Demonstrates bilateral squat with R pelvic rotation, such that L knee adducts toward midline, which produces a visible medial shift suggestive of instability.   Anterior Drawer Test POSITIVE   Side (if bilateral, select both right and left) Right   Knee Special Test Comments PIVOT SHIFT POSITIVE   Planned Therapy Interventions   Planned Therapy Interventions strengthening;gait training   Clinical Impression   Criteria for Skilled Therapeutic Interventions Met yes, treatment indicated   PT Diagnosis L knee instability (ACL)   Influenced by the following impairments L knee instability, pain   Functional limitations due to impairments all functional mobility   Clinical Presentation Stable/Uncomplicated   Clinical Presentation Rationale presents as diagnosed   Clinical Decision Making (Complexity) Moderate complexity   Therapy Frequency 1 time/week   Predicted Duration of Therapy Intervention (days/wks) 12 weeks   Risk & Benefits of therapy have been explained Yes   Patient, Family & other staff in agreement with plan of care Yes   Clinical Impression Comments Patient referred to physical therapy by physician with diagnosis of L knee instability. On clinical testing today, patient's left knee was positive in both anterior drawer and pivot shift test, suggesting a high probabily of anterior cruciate injury or rupture. Patient referred back to primary care. Magnetic resoance imaging and referral to orthopedics recommended.   ORTHO GOALS   PT Ortho Eval Goals 1;2;3   Ortho Goal 1   Goal Identifier Consult with primary care regarding clinical testing   Goal Description Patient will consult with primary care physicain regarding positive ACL testing.   Ortho Goal 2    Goal Identifier LEFS   Goal Description Patient will score 80/80 on LEFS to demonstrate global recovery of knee function.   Total Evaluation Time   PT Eval, Moderate Complexity Minutes (83169) 15   Therapy Certification   Certification date from 01/06/23   Certification date to 04/06/23   Medical Diagnosis Left Knee Instability                                                                            Therapy Frequency:  1 time/week  Predicted Duration of Therapy Intervention:  12 weeks    Kel Gtz, PT                 I CERTIFY THE NEED FOR THESE SERVICES FURNISHED UNDER        THIS PLAN OF TREATMENT AND WHILE UNDER MY CARE     (Physician co-signature of this document indicates review and certification of the therapy plan).                     Certification Date From:  (P) 01/06/23   Certification Date To:  (P) 04/06/23    Referring Provider:  Maritza Cruz MD    Initial Assessment        See Epic Evaluation Start of Care Date: 01/06/23

## 2023-02-27 NOTE — ADDENDUM NOTE
Encounter addended by: Kel Gtz, PT on: 2/27/2023 5:25 PM   Actions taken: Clinical Note Signed, Flowsheet accepted, Document created, Document edited

## 2023-03-04 ENCOUNTER — HOSPITAL ENCOUNTER (OUTPATIENT)
Dept: MRI IMAGING | Facility: CLINIC | Age: 28
Discharge: HOME | End: 2023-03-04
Attending: FAMILY MEDICINE | Admitting: FAMILY MEDICINE
Payer: COMMERCIAL

## 2023-03-04 DIAGNOSIS — M25.562 ANTERIOR KNEE PAIN, LEFT: ICD-10-CM

## 2023-03-04 PROCEDURE — 73721 MRI JNT OF LWR EXTRE W/O DYE: CPT | Mod: LT

## 2023-03-05 DIAGNOSIS — M23.204 DEGENERATIVE TEAR OF LEFT MEDIAL MENISCUS: ICD-10-CM

## 2023-03-05 DIAGNOSIS — S83.512A RUPTURE OF ANTERIOR CRUCIATE LIGAMENT OF LEFT KNEE, INITIAL ENCOUNTER: Primary | ICD-10-CM

## 2023-03-09 DIAGNOSIS — E11.29 DIABETES MELLITUS WITH PROTEINURIA (H): ICD-10-CM

## 2023-03-09 DIAGNOSIS — R80.9 DIABETES MELLITUS WITH PROTEINURIA (H): ICD-10-CM

## 2023-03-09 NOTE — TELEPHONE ENCOUNTER
Refill request. Unclear why?  I sent in new tabs last visit   I will take this script off his med list   Called to clarify with Sudhir - left message     Dr. Edmond

## 2023-03-16 NOTE — PROGRESS NOTES
Sudhir is a 27 year old who is being evaluated via a billable telephone visit.      What phone number would you like to be contacted at? ***  How would you like to obtain your AVS? {AVS Preference:252385}  {PROVIDER LOCATION On-site should be selected for visits conducted from your clinic location or adjoining [facility] hospital, academic office, or other nearby [facility] building. Off-site should be selected for all other provider locations, including home:651219}  Distant Location (provider location):  {virtual location provider:278673}    {PROVIDER CHARTING PREFERENCE:547181}    Subjective   Sudhir is a 27 year old M hx T2DM, morbid obesity, left ACL tear   {ACCOMPANIED BY STATEMENT (Optional):267208}, presenting for the following health issues:  Recheck Medication and Results (MRI of L knee)    HPI     Diabetes follow up   A1c 7.5 in 12/2022 (Due for recheck today). Goal <7    Diet: ***  Exercise:    ***    He is trying to do more but it got tough with his knee. Now working on getting knee surgery so he can work out more.   Meds:    Metformin 2000 mg daily    Was on Ozempic    **Interested in GLP1 -- wants to discuss   Weight today: *** (227 lb last month, which was already down from 240 prior)   Of note, due for:   -Recheck albumin now on ACE (lisinopril 10 mg). *Consider SGLT2 in future  -Recheck ALT. Elevated at 99 x 3 months ago. Likely NAFLD in setting of metabolic disorders; r/o Hep B/C    Knee follow up:  -Left Knee MR    1.  Age-indeterminate full-thickness proximal ACL tear, favor subacute to chronic.    2.  Medial meniscus oblique tear involving the body and posterior horn.  -Saw ortho? Has appointment 3/27 at the [facility]   -Re-established with PT? Thought he didn't need it   -Pain controlled with tylenol and ibuprofen     Review of Systems   {ROS COMP (Optional):757645}      Objective         Wt Readings from Last 4 Encounters:   02/21/23 103 kg (227 lb)   12/15/22 110 kg (242 lb 6.4 oz)   11/14/22 108.9 kg (240 lb)  "  08/30/22 108 kg (238 lb)     Estimated body mass index is 41.77 kg/m  as calculated from the following:    Height as of 12/15/22: 1.57 m (5' 1.81\").    Weight as of 2/21/23: 103 kg (227 lb).    Vitals:  No vitals were obtained today due to virtual visit.    Physical Exam   {GENERAL APPEARANCE:50::\"healthy\",\"alert\",\"no distress\"}  PSYCH: Alert and oriented times 3; coherent speech, normal   rate and volume, able to articulate logical thoughts, able   to abstract reason, no tangential thoughts, no hallucinations   or delusions  His affect is { :6905813::\"normal\"}  RESP: No cough, no audible wheezing, able to talk in full sentences  Remainder of exam unable to be completed due to telephone visits    {Diagnostic Test Results (Optional):221941}    {AMBULATORY ATTESTATION (Optional):484304}        Phone call duration: *** minutes    "

## 2023-03-17 ENCOUNTER — LAB (OUTPATIENT)
Dept: LAB | Facility: CLINIC | Age: 28
End: 2023-03-17
Payer: COMMERCIAL

## 2023-03-17 ENCOUNTER — VIRTUAL VISIT (OUTPATIENT)
Dept: FAMILY MEDICINE | Facility: CLINIC | Age: 28
End: 2023-03-17
Payer: COMMERCIAL

## 2023-03-17 DIAGNOSIS — E11.29 DIABETES MELLITUS WITH PROTEINURIA (H): Primary | ICD-10-CM

## 2023-03-17 DIAGNOSIS — E11.29 DIABETES MELLITUS WITH PROTEINURIA (H): ICD-10-CM

## 2023-03-17 DIAGNOSIS — R74.01 ELEVATED ALT MEASUREMENT: ICD-10-CM

## 2023-03-17 DIAGNOSIS — R80.9 DIABETES MELLITUS WITH PROTEINURIA (H): Primary | ICD-10-CM

## 2023-03-17 DIAGNOSIS — R80.9 DIABETES MELLITUS WITH PROTEINURIA (H): ICD-10-CM

## 2023-03-17 DIAGNOSIS — M23.204 DEGENERATIVE TEAR OF LEFT MEDIAL MENISCUS: ICD-10-CM

## 2023-03-17 DIAGNOSIS — S83.512D RUPTURE OF ANTERIOR CRUCIATE LIGAMENT OF LEFT KNEE, SUBSEQUENT ENCOUNTER: ICD-10-CM

## 2023-03-17 LAB
ALBUMIN SERPL BCG-MCNC: 4.2 G/DL (ref 3.5–5.2)
ALP SERPL-CCNC: 72 U/L (ref 40–129)
ALT SERPL W P-5'-P-CCNC: 28 U/L (ref 10–50)
AST SERPL W P-5'-P-CCNC: 20 U/L (ref 10–50)
BILIRUB DIRECT SERPL-MCNC: <0.2 MG/DL (ref 0–0.3)
BILIRUB SERPL-MCNC: 0.2 MG/DL
CREAT UR-MCNC: 94.7 MG/DL
HBA1C MFR BLD: 5.5 % (ref 0–5.6)
MICROALBUMIN UR-MCNC: 61.2 MG/L
MICROALBUMIN/CREAT UR: 64.63 MG/G CR (ref 0–17)
PROT SERPL-MCNC: 7.4 G/DL (ref 6.4–8.3)

## 2023-03-17 PROCEDURE — 36415 COLL VENOUS BLD VENIPUNCTURE: CPT

## 2023-03-17 PROCEDURE — 86706 HEP B SURFACE ANTIBODY: CPT

## 2023-03-17 PROCEDURE — 87340 HEPATITIS B SURFACE AG IA: CPT

## 2023-03-17 PROCEDURE — 83036 HEMOGLOBIN GLYCOSYLATED A1C: CPT

## 2023-03-17 PROCEDURE — 82570 ASSAY OF URINE CREATININE: CPT

## 2023-03-17 PROCEDURE — 99214 OFFICE O/P EST MOD 30 MIN: CPT | Mod: 95 | Performed by: STUDENT IN AN ORGANIZED HEALTH CARE EDUCATION/TRAINING PROGRAM

## 2023-03-17 PROCEDURE — 80076 HEPATIC FUNCTION PANEL: CPT

## 2023-03-17 PROCEDURE — 82043 UR ALBUMIN QUANTITATIVE: CPT

## 2023-03-17 PROCEDURE — 86803 HEPATITIS C AB TEST: CPT

## 2023-03-17 RX ORDER — LISINOPRIL 10 MG/1
10 TABLET ORAL DAILY
Qty: 90 TABLET | Refills: 1 | Status: SHIPPED | OUTPATIENT
Start: 2023-03-17

## 2023-03-17 NOTE — PROGRESS NOTES
"Family Medicine Telephone Visit Note  Chief Complaint   Patient presents with     Recheck Medication     Results     MRI of L knee       {If Provider starts visit, do consent and meds as above using \"VIRTUAL tab.\" For calls - Use [employer] tim to use their \"\" function to call without revealing your cell phone # and show your clinics phone number. Or use *67 before dialing the 10 digit #. DELETE THIS TEXT.}          HPI   Patients name: Sudhir  Appointment start time:  { :8988479}    {Superlists ():900450}      Current Outpatient Medications   Medication Sig Dispense Refill     ASPIRIN NOT PRESCRIBED (INTENTIONAL) Please choose reason not prescribed from choices below. (Patient not taking: Reported on 8/30/2022)       blood glucose (NO BRAND SPECIFIED) lancets standard Use to test blood sugar 1-2 times daily or as directed. 100 each 11     blood glucose (NO BRAND SPECIFIED) test strip Use to test blood sugar 1-2 times daily or as directed. 100 strip 11     blood glucose calibration (ONETOUCH VERIO) solution Use to calibrate blood glucose monitor as needed as directed. 100 each 3     blood glucose monitoring (NO BRAND SPECIFIED) meter device kit Use to test blood sugar 1-2 times daily or as directed. 1 kit 3     Blood Pressure Monitoring (ADULT BLOOD PRESSURE CUFF LG) KIT 1 each daily 1 kit 1     lisinopril (ZESTRIL) 10 MG tablet Take 1 tablet (10 mg) by mouth daily 90 tablet 1     metFORMIN (GLUCOPHAGE) 1000 MG tablet Take 1 tablet (1,000 mg) by mouth 2 times daily (with meals) 180 tablet 1     STATIN NOT PRESCRIBED (INTENTIONAL) Please choose reason not prescribed from choices below. (Patient not taking: Reported on 8/30/2022)       Allergies   Allergen Reactions     No Known Allergies               Review of Systems:     {ROS COMP (Optional):813447}         Physical Exam:     There were no vitals taken for this visit.  Estimated body mass index is 41.77 kg/m  as calculated from the following:    Height as of " "12/15/22: 1.57 m (5' 1.81\").    Weight as of 2/21/23: 103 kg (227 lb).    Exam:  Constitutional: {GENERAL APPEARANCE:708654::\"healthy\",\"alert\",\"no distress\"}  Psychiatric: {VANIA PATIENT PSYCH APPEARANCE:052223::\"mentation appears normal\",\"affect normal/bright\"}    {Result Choices:522699}        Assessment and Plan   {Diag Picklist:045948}    Refilled medications that would be required in the next 3 months.     After Visit Information:  {avs options:596484}    No follow-ups on file.    Appointment end time: { :5287847}  This is a telephone visit that took *** minutes.      Clinician location:  [facility]     Jeanine Edmond MD  I precepted today with ***.    {Bill telephone visit time codes. Coding will change to an E&M code if the patient's insurance allows for this.  However, documentation should support E&M code billing.  37698: 5-10 min  71728: 11-20 min  11956: 21-30 min}      {[employer] Language Line Access (Help text- F2 to highlight then hit delete)    Dial [phone]    Answer  Welcome to the Language Line Services.  Please enter your six-digit client ID.    [place] enter [id number]  [place] enter [id number]  [facility] enter [id number]  [place] enter [id number]    Answer  \"For Yi, press 1.  For all other languages, press 2.\"   they will ask you to say the name of the language.    Press either 1 (yes, correct) or 2 (no, incorrect).}  "

## 2023-03-17 NOTE — TELEPHONE ENCOUNTER
DIAGNOSIS: Rupture of ACL of left knee,Degenerative tear of left medial meniscus / MRI @ [facility] / [facility] / Val Callejas MD      APPOINTMENT DATE: 3.27.23   NOTES STATUS DETAILS   OFFICE NOTE from referring provider Internal 3.5.23 Val Callejas MD   OFFICE NOTE from other specialist Internal 2.21.23 Jeanine Edmond MD  12.15.22     DISCHARGE REPORT from the ER Internal 4.22.21 Daisha Chandra MD-[facility]   MEDICATION LIST Internal    LABS     MRI Internal 3.4.23 L knee     XRAYS (IMAGES & REPORTS) Internal Internal/[facility]  4.22.21 L knee

## 2023-03-17 NOTE — PROGRESS NOTES
I have personally reviewed the telephone encounter as documented by Dr. Edmond.  I agree with the assessment and plan as documented for 27 yr old male with obesity, DM evaluated for follow-up. Will start glp-1 to help w/ obesity and DM. Has ortho appt for ACL tear. Precautions given. Anticipatory guidance given.     Aashish Almazan MD  March 17, 2023  11:05 AM

## 2023-03-17 NOTE — PROGRESS NOTES
Sudhir is a 27 year old who is being evaluated via a billable telephone visit.      How would you like to obtain your AVS? NurseBuddyhart   Distant Location (provider location):  On-site    Assessment & Plan     Diabetes mellitus with proteinuria (H)  Due for A1c check, last just over goal at 7.5 (Goal <7). Working hard on diet, exercise, weight loss. On metformin. Great candidate for GLP1; we discussed at length today. Requests refills today.   - Hemoglobin A1c; Future  - Albumin Random Urine Quantitative with Creat Ratio; Future   Recheck urine albumin now on ACE (lisinopril 10 mg).    *Consider SGLT2 in future  - Refill: lisinopril (ZESTRIL) 10 MG tablet; Take 1 tablet (10 mg) by mouth daily  Dispense: 90 tablet; Refill: 1  - Refill: metFORMIN (GLUCOPHAGE) 1000 MG tablet; Take 1 tablet (1,000 mg) by mouth 2 times daily (with meals)  Dispense: 180 tablet; Refill: 1  - New: exenatide ER (BYDUREON BCISE) 2 MG/0.85ML auto-injector; Inject 2 mg Subcutaneous every 7 days  Dispense: 0.85 mL; Refill: 11   Discussed risks/benefits of following med, verbalized understanding and in agreement to starting   - Needs to get labs done    If normal, will send [employer] message    If abnormal, needs follow up phone visit     Degenerative tear of left medial meniscus  Rupture of anterior cruciate ligament of left knee, subsequent encounter  Re-emphasized diagnoses per MR. Has ortho appointment, not PT yet.   -Encouraged to make PT apptmt -- gave phone numbers   -Encouraged ortho follow up as scheduled   -Re-emphasized precautions as per last message   -He declines more pain control      Elevated ALT measurement  Recheck ALT. Elevated at 99 x 3 months ago. Likely NAFLD in setting of metabolic disorders; r/o Hep B/C  - Hepatic function panel; Future  - Hepatitis B surface antigen; Future  - Hepatitis B Surface Antibody; Future  - Hepatitis C Screen Reflex to HCV RNA Quant and Genotype; Future   - Could consider RUQ ultrasound in future   "    BMI:   Estimated body mass index is 41.77 kg/m  as calculated from the following:    Height as of 12/15/22: 1.57 m (5' 1.81\").    Weight as of 2/21/23: 103 kg (227 lb).    Working on diet as above   Hoping to add GLP1   Continue to consider surgery as an option -- he wants to prioritize his knee for now     Jeanine Edmond DO [facility]    Precepted with Dr. Norah Alvarez   Sudhir is a 27 year old presenting for the following health issues:  Recheck Medication and Results (MRI of L knee)      HPI     Diabetes follow up   A1c 7.5 in 12/2022 (Due for recheck today). Goal <7    Diet: increasing veggies, decreasing rice   Exercise:                He is trying to do more but it got tough with his knee. Now working on getting knee surgery so he can work out more.   Meds:               Metformin 2000 mg daily               Was on Ozempic               **Interested in GLP1 -- wants to discuss   Tried to get semaglutide in the past but his insurance did not cover it.   Of note, due for:   -Recheck urine albumin now on ACE (lisinopril 10 mg). *Consider SGLT2 in future  -Recheck ALT. Elevated at 99 x 3 months ago. Likely NAFLD in setting of metabolic disorders; r/o Hep B/C     Knee follow up:  -Left Knee MR               1.  Age-indeterminate full-thickness proximal ACL tear, favor subacute to chronic.               2.  Medial meniscus oblique tear involving the body and posterior horn.  -Saw ortho? Has appointment 3/27 at the [facility]   -Re-established with PT? Thought he didn't need it   -Pain controlled with tylenol and ibuprofen      Review of Systems   Constitutional, HEENT, cardiovascular, pulmonary, GI, , musculoskeletal, neuro, skin, endocrine and psych systems are negative, except as otherwise noted.      Objective           Vitals:  No vitals were obtained today due to virtual visit.    Physical Exam   alert and no distress  PSYCH: Alert and oriented times 3; coherent " speech, normal   rate and volume, able to articulate logical thoughts, able   to abstract reason, no tangential thoughts, no hallucinations   or delusions  His affect is normal  RESP: No cough, no audible wheezing, able to talk in full sentences  Remainder of exam unable to be completed due to telephone visits    Start 10 am   End 1023 am   Phone call duration: 23 minutes

## 2023-03-18 LAB
HBV SURFACE AB SERPL IA-ACNC: 1.15 M[IU]/ML
HBV SURFACE AB SERPL IA-ACNC: NONREACTIVE M[IU]/ML
HBV SURFACE AG SERPL QL IA: NONREACTIVE
HCV AB SERPL QL IA: NONREACTIVE

## 2023-03-27 ENCOUNTER — OFFICE VISIT (OUTPATIENT)
Dept: ORTHOPEDICS | Facility: CLINIC | Age: 28
End: 2023-03-27
Attending: FAMILY MEDICINE
Payer: COMMERCIAL

## 2023-03-27 ENCOUNTER — TELEPHONE (OUTPATIENT)
Dept: ORTHOPEDICS | Facility: CLINIC | Age: 28
End: 2023-03-27

## 2023-03-27 ENCOUNTER — PRE VISIT (OUTPATIENT)
Dept: ORTHOPEDICS | Facility: CLINIC | Age: 28
End: 2023-03-27

## 2023-03-27 VITALS — WEIGHT: 227 LBS | BODY MASS INDEX: 42.86 KG/M2 | HEIGHT: 61 IN

## 2023-03-27 DIAGNOSIS — M23.204 DEGENERATIVE TEAR OF LEFT MEDIAL MENISCUS: ICD-10-CM

## 2023-03-27 DIAGNOSIS — S83.512A RUPTURE OF ANTERIOR CRUCIATE LIGAMENT OF LEFT KNEE, INITIAL ENCOUNTER: ICD-10-CM

## 2023-03-27 DIAGNOSIS — S83.512A RUPTURE OF ANTERIOR CRUCIATE LIGAMENT OF LEFT KNEE, INITIAL ENCOUNTER: Primary | ICD-10-CM

## 2023-03-27 PROCEDURE — 99204 OFFICE O/P NEW MOD 45 MIN: CPT | Mod: GC | Performed by: ORTHOPAEDIC SURGERY

## 2023-03-27 NOTE — PROGRESS NOTES
CHIEF CONCERN: Left knee ACL tear    HISTORY:   27M here for the above noted concern. Patient is unsure of how this happened exactly but does state that he had a long boarding accident about 2 years ago and thinks it may of happened then. Over the last 2 years when the patient works out in the gym he has notice some pain and instability about the left knee. Saw another provider who obtained an MRI which revealed the ACL tear and medial meniscus tear in the left knee and was then sent here surgical consultation.     PAST MEDICAL HISTORY: (Reviewed with the patient and in the [facility] medical record)  1. Denies    PAST SURGICAL HISTORY: (Reviewed with the patient and in the [facility] medical record)  1. Denies    MEDICATIONS: (Reviewed with the patient and in the [facility] medical record)    Notable medications include: Denies    ALLERGIES: (Reviewed with the patient and in the [facility] medical record)  1. NKDA      SOCIAL HISTORY: (Reviewed with the patient and in the medical record)  --Tobacco: None  --Avocation/Sport: Running, Cardio, working out, lifting    FAMILY HISTORY: (Reviewed with the patient and in the medical record)  -- No family history of bleeding, clotting, or difficulty with anesthesia    REVIEW OF SYSTEMS: (Reviewed with the patient and on the health intake form)  -- A comprehensive 10 point review of systems was conducted and is negative except as noted in the HPI    EXAM:     General: Awake, Alert and Oriented, No acute Distress. Articulate and Interactive    Body mass index is 42.89 kg/m .    Left Lower extremity :    Skin is Warm and Well perfused, no suggestion of infection    Full extension to 130 degrees of flexion with no pain    Mild TTP about the medial joint line    2B lachman    Knee stable to posterior drawer and varus and valgus stressing    EHL/FHL/TA/GS 5/5    Sensation intact L3-S1    2+ Dorsalis Pedis Pulse    IMAGING:    Radiographs of the left knee from 4/22/21 were independently reviewed by me  and findings were discussed with the patient today. The imaging demonstrates no significant degenerative changes. No fracture or dislocations noted.    MRI of the left knee from 3/4/23 were independently reviewed by me and findings were discussed with the patient today. The imaging demonstrates complete chronic appearing ACL tear. Medial meniscus oblique tear involving the body and posterior horn. Some mild cartilage wearing in the medial compartment.     ASSESSMENT:  1. 27M with left knee chronic ACL and medial meniscus tear    PLAN:  The risks and benefits of the available surgical and non-surgical treatment options were discussed with the patient.  All of the patient's questions were answered and the operative procedure was explained.  Specific risks addressed today include, but are not limited to, adverse effects of anesthesia, infection, bleeding, pain, stiffness, blood clots, nerve and vessel damage, delayed healing, and re injury.  The possible restrictions during rehab and length of rehab were also discussed.  The patient indicates good understanding and wishes to proceed with surgery which would involve left knee arthroscopy, ACL reconstruction and meniscal surgery.  The patient will proceed with scheduling the surgery based upon the recommendations reviewed during today's visit and a pre-operative work -up will be completed within 30 days of the procedure.  Follow up after surgery.

## 2023-03-27 NOTE — PROGRESS NOTES
Patient seen and examined with the fellow. I also personally reviewed the images and interpreted the imaging myself.     Assesment: acl tear - chronic    Medial meniscus tear    Plan: I discussed with the patient the risks, benefits, complications and techniques of surgery as well as the natural history of acl tears, meniscus tears and the alternative treatment options.    The risks include, but are not limited to the risk of death and risk of a myocardial infarction, risk of bleeding and a risk of infection, risk of nerve damage and a risk of muscle damage, stiffness, instability, continued pain or worsening pain and retear of acl, retear of meniscus, stiffness requiring manipulation, .    The patient was provided an opportunity to ask questions and these were answered.      I agree with history, physical and imaging as well as the assessment and plan as detailed by Dr. Guerra.
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
## 2023-03-27 NOTE — TELEPHONE ENCOUNTER
Procedure: ACL BTB  Facility: [facility]  Length: 90 minutes  Anesthesia: Choice  Post-op appointments needed: 2 weeks provider only, 6 weeks with provider only.  Surgery packet/instructions given to patient?  Yes     Surgery date planned for 5/26/23    Pre-Operative Teaching Flowsheet     Person(s) involved in teaching: Patient     Motivation Level:  Receptive (willing/able to accept information) and asks appropriate questions where applicable: Yes  Any cultural factors/Nondenominational beliefs that may influence understanding or compliance? No     Patient demonstrates understanding of the following:  Pre-operative planning, including the necessary appointments and preparation needed prior to surgery: Yes  Which situations necessitate calling provider and whom to contact: Yes  Pain management techniques pre and post op: Yes  How, and when, to access community resources: Yes    Who will stay/ with patient after surgery: Mom or dad  Who will drive patient to surgery: mom or dad  PCP within [facility].  PT within [facility].         Additional Teaching Concerns Addressed:   Post-operative living arrangements and necessary adaptations to living environment.     Instructional Materials Used/Given: Yes, pre-op packet given including forms for Your surgery day, medications to stop taking before surgery, preparing for surgery, Covid-19 testing, showering before surgery, Stop light tool introduced, Opioid pain medication guideline, pre-op physical form, and map  Patient expressed understanding of all forms given, questions were answered and will review in more detail at home.     Time spent with patient: 20 minutes.

## 2023-05-01 ENCOUNTER — OFFICE VISIT (OUTPATIENT)
Dept: FAMILY MEDICINE | Facility: CLINIC | Age: 28
End: 2023-05-01
Payer: COMMERCIAL

## 2023-05-01 VITALS
OXYGEN SATURATION: 98 % | SYSTOLIC BLOOD PRESSURE: 109 MMHG | BODY MASS INDEX: 42.32 KG/M2 | WEIGHT: 224 LBS | DIASTOLIC BLOOD PRESSURE: 76 MMHG | HEART RATE: 88 BPM

## 2023-05-01 DIAGNOSIS — E66.813 CLASS 3 SEVERE OBESITY DUE TO EXCESS CALORIES WITH SERIOUS COMORBIDITY AND BODY MASS INDEX (BMI) OF 40.0 TO 44.9 IN ADULT (H): ICD-10-CM

## 2023-05-01 DIAGNOSIS — E11.29 DIABETES MELLITUS WITH PROTEINURIA (H): ICD-10-CM

## 2023-05-01 DIAGNOSIS — E66.01 CLASS 3 SEVERE OBESITY DUE TO EXCESS CALORIES WITH SERIOUS COMORBIDITY AND BODY MASS INDEX (BMI) OF 40.0 TO 44.9 IN ADULT (H): ICD-10-CM

## 2023-05-01 DIAGNOSIS — Z01.818 PREOP GENERAL PHYSICAL EXAM: Primary | ICD-10-CM

## 2023-05-01 DIAGNOSIS — R80.9 DIABETES MELLITUS WITH PROTEINURIA (H): ICD-10-CM

## 2023-05-01 DIAGNOSIS — G47.33 OSA (OBSTRUCTIVE SLEEP APNEA): Chronic | ICD-10-CM

## 2023-05-01 DIAGNOSIS — S83.512D RUPTURE OF ANTERIOR CRUCIATE LIGAMENT OF LEFT KNEE, SUBSEQUENT ENCOUNTER: ICD-10-CM

## 2023-05-01 PROCEDURE — 99214 OFFICE O/P EST MOD 30 MIN: CPT | Mod: GC | Performed by: STUDENT IN AN ORGANIZED HEALTH CARE EDUCATION/TRAINING PROGRAM

## 2023-05-01 NOTE — PROGRESS NOTES
[facility] [address] [place] [address]  Phone: [phone]  Fax: [phone]  Primary Provider: Theron Edmond  Pre-op Performing Provider: THERON EDMOND    PREOPERATIVE EVALUATION:  Today's date: 5/1/2023    Sudhir Mueller is a 27 year old male who presents for a preoperative evaluation.      5/1/2023     2:00 PM   Additional Questions   Roomed by Etdayne     Surgical Information:  Surgery/Procedure: L Knee Examination  Surgery Location: [facility]  Surgeon: Dr. Garber   Surgery Date: 5/26  Time of Surgery: not sure   Where patient plans to recover: At home with family  Fax number for surgical facility: Note does not need to be faxed, will be available electronically in Epic.    Assessment & Plan     Preop general physical exam  Rupture of anterior cruciate ligament of left knee, subsequent encounter  The proposed surgical procedure is considered LOW to moderate risk.   Risk calculators: Grubbs risk 0.1% (low); RCRI score 0, indicating 3.9% chance (low) cardiac risk   Able to tolerate reasonable amount of METs   No medical contraindication to proceeding with procedure as outlined.   Reasonably medically optimized.  Elevated mallampati score   PLAN:  -Approval for procedure as below   -Considered blood work / EKG for baseline but overall low risk for bleed / complications   -Not meeting criteria for stress test vs other     Diabetes mellitus with proteinuria (H)  A1c at goal <7 within last month. With lifestyle change, metformin, GLP1.   -Continue current management   -Med recs / hold times as noted in pt instructions     MONA (obstructive sleep apnea)- severe (AHI 51)  On CPAP.   -Continue CPAP  -Requested he bring this to procedure   -Recommend making appointment with his sleep provider within next few months     Class 3 severe obesity due to excess calories with serious comorbidity and body mass index (BMI) of 40.0 to 44.9 in adult (H)  Possibly  complicates procedure / anesthesia plan  -Note     -Medication recommendations:      Hold metformin day of surgery       Hold the previous weekly dose of exenatide prior to the surgery (resume following the procedure)       Hold lisinopril day of surgery       - No identified additional risk factors other than previously addressed    RECOMMENDATION:  APPROVAL GIVEN to proceed with proposed procedure, without further diagnostic evaluation.    Dino Edmond DO   [facility] Resident     Precepted with Dr. Mahajan        Subjective     HPI related to upcoming procedure:     26 yo M hx left ACL/meniscus tear, morbid obesity, T2DM with proteinuria, severe MONA , dyslipidemia     Plan for surgical ACL / meniscus repair on 5/26/23 through "[facility] [facility]   Procedure is low to intermediate risk from cardiac standpoint (borderline major ortho surgery). Further discussed below.      No chest pain, dyspnea, other related sx to suggest any need for stress test vs echo prior to the procedure      Cardiac risks include:   -Hx T2DM: A1c 5.5 on 3/17/23 on metformin, exenatide   -Hx dyslipidemia:  on 12/15/22 (down from >200). Declined statin in past; reasonable to be off given improvement in LDL with lifestyle.   -No overt hx HTN, tobacco use, other     Other relevant medical hx:  -Severe MONA with AHI of >50 in last year. On CPAP:   [facility]herent.   -On ACE for hx proteinuria. recently improved from 170-> 64 on ACE and with better diabetes control   -Morbid obesity. BMI >40 but has lost ~15 lbs since last fall. On metformin, GLP1     METs tolerated: >6 definitely   able to run from CV standpoint -- leg pain from knee injuries is limiting         4/30/2023     2:57 PM   Preop Questions   1. Have you ever had a heart attack or stroke? No   2. Have you ever had surgery on your heart or blood vessels, such as a stent placement, a coronary artery bypass, or surgery on an artery in your head, neck, heart, or legs?  No   3. Do you have chest pain with activity? No   4. Do you have a history of  heart failure? No   5. Do you currently have a cold, bronchitis or symptoms of other infection? No   6. Do you have a cough, shortness of breath, or wheezing? No   7. Do you or anyone in your family have previous history of blood clots? No   8. Do you or does anyone in your family have a serious bleeding problem such as prolonged bleeding following surgeries or cuts? No   9. Have you ever had problems with anemia or been told to take iron pills? No   10. Have you had any abnormal blood loss such as black, tarry or bloody stools? No   11. Have you ever had a blood transfusion?  no    12. Are you willing to have a blood transfusion if it is medically needed before, during, or after your surgery? Yes    13. Have you or any of your relatives ever had problems with anesthesia? No   14. Do you have sleep apnea, excessive snoring or daytime drowsiness? No   15. Do you have any artifical heart valves or other implanted medical devices like a pacemaker, defibrillator, or continuous glucose monitor? No   16. Do you have artificial joints? No   17. Are you allergic to latex? No     Health Care Directive:  Patient does not have a Health Care Directive or Living Will: Discussed advance care planning with patient; however, patient declined at this time.    Preoperative Review of :   reviewed - no record of controlled substances prescribed.      Review of Systems  As noted in questionnaire     Patient Active Problem List    Diagnosis Date Noted     Degenerative tear of left medial meniscus 03/17/2023     Priority: Medium     Rupture of anterior cruciate ligament of left knee, subsequent encounter 03/17/2023     Priority: Medium     Anterior knee pain, left 02/21/2023     Priority: Medium     MONA (obstructive sleep apnea)- severe (AHI 51) 08/30/2022     Priority: Medium     Home Sleep ApneaTest  5/26/2022  (234 lb) - Apnea/hypopnea index [AHI] of  51 events per hour.  AHI was 52 per hour supine, 40 per hour prone, 56 per hour on left side, and n/a per hour on right side. Percent of time spent: supine - 76%, prone - 7%, on left - 16%, on right - 0%. Time with saturation less than or equal to 88% was 89 minutes. The lowest oxygen saturation was 51%.        Dyslipidemia associated with type 2 diabetes mellitus (H) 01/17/2022     Priority: Medium     Knee instability, left 01/16/2022     Priority: Medium     NAFLD (nonalcoholic fatty liver disease) 01/16/2022     Priority: Medium     ultrasound 12/2021       Transaminitis 11/23/2021     Priority: Medium     Diabetes mellitus with proteinuria (H) 11/10/2021     Priority: Medium     Elevated blood pressure reading without diagnosis of hypertension 11/10/2021     Priority: Medium     Nocturia 09/26/2021     Priority: Medium     Type 2 diabetes mellitus without complication, without long-term current use of insulin (H) 09/26/2021     Priority: Medium     Class 3 severe obesity with serious comorbidity and body mass index (BMI) of 45.0 to 49.9 in adult, unspecified obesity type (H) 09/23/2021     Priority: Medium      No past medical history on file.  Past Surgical History:   Procedure Laterality Date     NO HISTORY OF SURGERY       Current Outpatient Medications   Medication Sig Dispense Refill     ASPIRIN NOT PRESCRIBED (INTENTIONAL) Please choose reason not prescribed from choices below.       blood glucose (NO BRAND SPECIFIED) lancets standard Use to test blood sugar 1-2 times daily or as directed. 100 each 11     blood glucose (NO BRAND SPECIFIED) test strip Use to test blood sugar 1-2 times daily or as directed. 100 strip 11     blood glucose calibration (ONETOUCH VERIO) solution Use to calibrate blood glucose monitor as needed as directed. 100 each 3     blood glucose monitoring (NO BRAND SPECIFIED) meter device kit Use to test blood sugar 1-2 times daily or as directed. 1 kit 3     Blood Pressure Monitoring  (ADULT BLOOD PRESSURE CUFF LG) KIT 1 each daily 1 kit 1     exenatide ER (BYDUREON BCISE) 2 MG/0.85ML auto-injector Inject 2 mg Subcutaneous every 7 days 0.85 mL 11     lisinopril (ZESTRIL) 10 MG tablet Take 1 tablet (10 mg) by mouth daily 90 tablet 1     metFORMIN (GLUCOPHAGE) 1000 MG tablet Take 1 tablet (1,000 mg) by mouth 2 times daily (with meals) 180 tablet 1     STATIN NOT PRESCRIBED (INTENTIONAL) Please choose reason not prescribed from choices below.         Allergies   Allergen Reactions     No Known Allergies         Social History     Tobacco Use     Smoking status: Never     Passive exposure: Never     Smokeless tobacco: Never   Vaping Use     Vaping status: Never Used   Substance Use Topics     Alcohol use: Yes     Comment: rarely 1 time per month       Objective     /76 (BP Location: Right arm, Patient Position: Sitting, Cuff Size: Adult Regular)   Pulse 88   Wt 101.6 kg (224 lb)   SpO2 98%   BMI 42.32 kg/m      Physical Exam    Gen: Pleasant. No distress.    HEENT: MMM. Mallampati 3   Neck: No overt asymmetry. No JVD (hard to appreciate with habitus)   CV: Appears well-perfused. RRR. No murmur.   Resp: Breathing comfortably on room air. Lungs clear to auscultation bilaterally without wheeze or crackle.   Abd: Non-distended, non-tender.   Ext: No edema or overt asymmetry/deformity.   Skin: No overt rash on easily visualized skin.   Neuro: Non-focal.   Psych: Calm.       Recent Labs   Lab Test 03/17/23  1505 12/15/22  1115 01/10/22  1136 11/09/21  0958   NA  --  139  --  141   POTASSIUM  --  4.0  --  4.2   CR  --  0.68  --  0.60   A1C 5.5 7.5*   < >  --     < > = values in this interval not displayed.        Diagnostics:  None     Revised Cardiac Risk Index (RCRI):  The patient has the following serious cardiovascular risks for perioperative complications:   - No serious cardiac risks = 0 points    RCRI Interpretation: 0 points: Class I (very low risk - 0.4% complication rate)            Signed Electronically by: Jeanine Edmond DO  Copy of this evaluation report is provided to requesting physician.

## 2023-05-02 NOTE — PROGRESS NOTES
Preceptor Attestation:   Patient seen, evaluated and discussed with the resident. I have verified the content of the note, which accurately reflects my assessment of the patient and the plan of care.    Supervising Physician:Cruz Mahajan MD    [facility]

## 2023-05-16 ENCOUNTER — APPOINTMENT (OUTPATIENT)
Dept: URBAN - METROPOLITAN AREA CLINIC 260 | Age: 28
Setting detail: DERMATOLOGY
End: 2023-05-16

## 2023-05-16 VITALS — WEIGHT: 215 LBS | HEIGHT: 63 IN

## 2023-05-16 DIAGNOSIS — L91.8 OTHER HYPERTROPHIC DISORDERS OF THE SKIN: ICD-10-CM

## 2023-05-16 PROCEDURE — OTHER MIPS QUALITY: OTHER

## 2023-05-16 PROCEDURE — OTHER LIQUID NITROGEN: OTHER

## 2023-05-16 PROCEDURE — 17110 DESTRUCT B9 LESION 1-14: CPT

## 2023-05-16 PROCEDURE — OTHER COUNSELING: OTHER

## 2023-05-16 ASSESSMENT — LOCATION DETAILED DESCRIPTION DERM
LOCATION DETAILED: LEFT LATERAL TRAPEZIAL NECK
LOCATION DETAILED: RIGHT CLAVICULAR NECK
LOCATION DETAILED: LEFT CLAVICULAR NECK
LOCATION DETAILED: LEFT MEDIAL TRAPEZIAL NECK

## 2023-05-16 ASSESSMENT — LOCATION SIMPLE DESCRIPTION DERM
LOCATION SIMPLE: POSTERIOR NECK
LOCATION SIMPLE: LEFT ANTERIOR NECK
LOCATION SIMPLE: RIGHT ANTERIOR NECK

## 2023-05-16 ASSESSMENT — LOCATION ZONE DERM: LOCATION ZONE: NECK

## 2023-05-16 NOTE — PROCEDURE: LIQUID NITROGEN
Detail Level: Detailed
Show Spray Paint Technique Variable?: Yes
Medical Necessity Information: It is in your best interest to select a reason for this procedure from the list below. All of these items fulfill various CMS LCD requirements except the new and changing color options.
Add 52 Modifier (Optional): no
Application Tool (Optional): Cry-AC
Medical Necessity Clause: This procedure was medically necessary because the lesions that were treated were:
Number Of Freeze-Thaw Cycles: 2 freeze-thaw cycles
Post-Care Instructions: I reviewed with the patient in detail post-care instructions. Patient is to wear sunprotection, and avoid picking at any of the treated lesions. Pt may apply Vaseline to crusted or scabbing areas.
Spray Paint Text: The liquid nitrogen was applied to the skin utilizing a spray paint frosting technique.
Consent: The patient's consent was obtained including but not limited to risks of crusting, scabbing, blistering, scarring, darker or lighter pigmentary change, recurrence, incomplete removal and infection.
Duration Of Freeze Thaw-Cycle (Seconds): 10-15
Other

## 2023-05-19 ENCOUNTER — DOCUMENTATION ONLY (OUTPATIENT)
Dept: ORTHOPEDICS | Facility: CLINIC | Age: 28
End: 2023-05-19
Payer: COMMERCIAL

## 2023-05-20 NOTE — PROGRESS NOTES
Received Completed forms Yes   Faxed Forms Faxed To: [facility]  Fax Number: [phone]   Sent to [facility] (Date) 5/19/23

## 2023-05-25 ENCOUNTER — ANESTHESIA EVENT (OUTPATIENT)
Dept: SURGERY | Facility: AMBULATORY SURGERY CENTER | Age: 28
End: 2023-05-25
Payer: COMMERCIAL

## 2023-05-26 ENCOUNTER — HOSPITAL ENCOUNTER (OUTPATIENT)
Facility: AMBULATORY SURGERY CENTER | Age: 28
Discharge: HOME | End: 2023-05-26
Attending: ORTHOPAEDIC SURGERY
Payer: COMMERCIAL

## 2023-05-26 ENCOUNTER — ANESTHESIA (OUTPATIENT)
Dept: SURGERY | Facility: AMBULATORY SURGERY CENTER | Age: 28
End: 2023-05-26
Payer: COMMERCIAL

## 2023-05-26 VITALS
WEIGHT: 224 LBS | BODY MASS INDEX: 42.29 KG/M2 | OXYGEN SATURATION: 97 % | HEIGHT: 61 IN | TEMPERATURE: 97.9 F | RESPIRATION RATE: 12 BRPM | SYSTOLIC BLOOD PRESSURE: 140 MMHG | DIASTOLIC BLOOD PRESSURE: 74 MMHG | HEART RATE: 81 BPM

## 2023-05-26 DIAGNOSIS — S83.512D RUPTURE OF ANTERIOR CRUCIATE LIGAMENT OF LEFT KNEE, SUBSEQUENT ENCOUNTER: Primary | ICD-10-CM

## 2023-05-26 LAB
GLUCOSE BLDC GLUCOMTR-MCNC: 109 MG/DL (ref 70–99)
GLUCOSE BLDC GLUCOMTR-MCNC: 110 MG/DL (ref 70–99)

## 2023-05-26 PROCEDURE — 29888 ARTHRS AID ACL RPR/AGMNTJ: CPT | Mod: LT

## 2023-05-26 PROCEDURE — C1713 ANCHOR/SCREW BN/BN,TIS/BN: HCPCS

## 2023-05-26 PROCEDURE — 82962 GLUCOSE BLOOD TEST: CPT | Performed by: PATHOLOGY

## 2023-05-26 PROCEDURE — 29882 ARTHRS KNE SRG MNISC RPR M/L: CPT | Mod: LT

## 2023-05-26 PROCEDURE — 29888 ARTHRS AID ACL RPR/AGMNTJ: CPT | Mod: LT | Performed by: ORTHOPAEDIC SURGERY

## 2023-05-26 PROCEDURE — C9290 INJ, BUPIVACAINE LIPOSOME: HCPCS

## 2023-05-26 PROCEDURE — 29882 ARTHRS KNE SRG MNISC RPR M/L: CPT | Mod: LT | Performed by: ORTHOPAEDIC SURGERY

## 2023-05-26 PROCEDURE — C1762 CONN TISS, HUMAN(INC FASCIA): HCPCS | Mod: ZZSP

## 2023-05-26 DEVICE — IMP SCR ARTHREX CAN 07X20MM AR-1370E: Type: IMPLANTABLE DEVICE | Site: KNEE | Status: FUNCTIONAL

## 2023-05-26 DEVICE — IMP SCR ARTHREX CAN FULL THRD 08X25MM AR-1381T: Type: IMPLANTABLE DEVICE | Site: KNEE | Status: FUNCTIONAL

## 2023-05-26 DEVICE — IMP ANCHOR ARTHREX BIO-SWIVELOCK 5.5MM AR-2323BCC: Type: IMPLANTABLE DEVICE | Site: KNEE | Status: FUNCTIONAL

## 2023-05-26 RX ORDER — GLYCOPYRROLATE 0.2 MG/ML
INJECTION, SOLUTION INTRAMUSCULAR; INTRAVENOUS PRN
Status: DISCONTINUED | OUTPATIENT
Start: 2023-05-26 | End: 2023-05-26

## 2023-05-26 RX ORDER — ACETAMINOPHEN 325 MG/1
650 TABLET ORAL
Status: DISCONTINUED | OUTPATIENT
Start: 2023-05-26 | End: 2023-05-27 | Stop reason: HOSPADM

## 2023-05-26 RX ORDER — ACETAMINOPHEN 325 MG/1
650 TABLET ORAL EVERY 4 HOURS PRN
Qty: 50 TABLET | Refills: 0 | Status: SHIPPED | OUTPATIENT
Start: 2023-05-26

## 2023-05-26 RX ORDER — KETOROLAC TROMETHAMINE 30 MG/ML
INJECTION, SOLUTION INTRAMUSCULAR; INTRAVENOUS PRN
Status: DISCONTINUED | OUTPATIENT
Start: 2023-05-26 | End: 2023-05-26

## 2023-05-26 RX ORDER — FENTANYL CITRATE 50 UG/ML
25-50 INJECTION, SOLUTION INTRAMUSCULAR; INTRAVENOUS
Status: DISCONTINUED | OUTPATIENT
Start: 2023-05-26 | End: 2023-05-26 | Stop reason: HOSPADM

## 2023-05-26 RX ORDER — CEFAZOLIN SODIUM 2 G/50ML
2 SOLUTION INTRAVENOUS
Status: COMPLETED | OUTPATIENT
Start: 2023-05-26 | End: 2023-05-26

## 2023-05-26 RX ORDER — ONDANSETRON 4 MG/1
4 TABLET, ORALLY DISINTEGRATING ORAL EVERY 30 MIN PRN
Status: DISCONTINUED | OUTPATIENT
Start: 2023-05-26 | End: 2023-05-26 | Stop reason: HOSPADM

## 2023-05-26 RX ORDER — BUPIVACAINE HYDROCHLORIDE 5 MG/ML
INJECTION, SOLUTION EPIDURAL; INTRACAUDAL
Status: COMPLETED | OUTPATIENT
Start: 2023-05-26 | End: 2023-05-26

## 2023-05-26 RX ORDER — AMOXICILLIN 250 MG
1-2 CAPSULE ORAL 2 TIMES DAILY
Qty: 30 TABLET | Refills: 0 | Status: SHIPPED | OUTPATIENT
Start: 2023-05-26

## 2023-05-26 RX ORDER — ASPIRIN 81 MG/1
162 TABLET ORAL DAILY
Qty: 56 TABLET | Refills: 0 | Status: SHIPPED | OUTPATIENT
Start: 2023-05-26 | End: 2023-06-23

## 2023-05-26 RX ORDER — ACETAMINOPHEN 325 MG/1
975 TABLET ORAL ONCE
Status: COMPLETED | OUTPATIENT
Start: 2023-05-26 | End: 2023-05-26

## 2023-05-26 RX ORDER — FENTANYL CITRATE 50 UG/ML
25 INJECTION, SOLUTION INTRAMUSCULAR; INTRAVENOUS EVERY 5 MIN PRN
Status: DISCONTINUED | OUTPATIENT
Start: 2023-05-26 | End: 2023-05-26 | Stop reason: HOSPADM

## 2023-05-26 RX ORDER — PROPOFOL 10 MG/ML
INJECTION, EMULSION INTRAVENOUS CONTINUOUS PRN
Status: DISCONTINUED | OUTPATIENT
Start: 2023-05-26 | End: 2023-05-26

## 2023-05-26 RX ORDER — SODIUM CHLORIDE, SODIUM LACTATE, POTASSIUM CHLORIDE, CALCIUM CHLORIDE 600; 310; 30; 20 MG/100ML; MG/100ML; MG/100ML; MG/100ML
INJECTION, SOLUTION INTRAVENOUS CONTINUOUS
Status: DISCONTINUED | OUTPATIENT
Start: 2023-05-26 | End: 2023-05-26 | Stop reason: HOSPADM

## 2023-05-26 RX ORDER — ONDANSETRON 2 MG/ML
4 INJECTION INTRAMUSCULAR; INTRAVENOUS EVERY 30 MIN PRN
Status: DISCONTINUED | OUTPATIENT
Start: 2023-05-26 | End: 2023-05-26 | Stop reason: HOSPADM

## 2023-05-26 RX ORDER — NALOXONE HYDROCHLORIDE 0.4 MG/ML
0.4 INJECTION, SOLUTION INTRAMUSCULAR; INTRAVENOUS; SUBCUTANEOUS
Status: DISCONTINUED | OUTPATIENT
Start: 2023-05-26 | End: 2023-05-26 | Stop reason: HOSPADM

## 2023-05-26 RX ORDER — FLUMAZENIL 0.1 MG/ML
0.2 INJECTION, SOLUTION INTRAVENOUS
Status: DISCONTINUED | OUTPATIENT
Start: 2023-05-26 | End: 2023-05-26 | Stop reason: HOSPADM

## 2023-05-26 RX ORDER — DEXAMETHASONE SODIUM PHOSPHATE 4 MG/ML
INJECTION, SOLUTION INTRA-ARTICULAR; INTRALESIONAL; INTRAMUSCULAR; INTRAVENOUS; SOFT TISSUE PRN
Status: DISCONTINUED | OUTPATIENT
Start: 2023-05-26 | End: 2023-05-26

## 2023-05-26 RX ORDER — ONDANSETRON 4 MG/1
4 TABLET, ORALLY DISINTEGRATING ORAL EVERY 30 MIN PRN
Status: DISCONTINUED | OUTPATIENT
Start: 2023-05-26 | End: 2023-05-27 | Stop reason: HOSPADM

## 2023-05-26 RX ORDER — OXYCODONE HYDROCHLORIDE 5 MG/1
5 TABLET ORAL
Status: DISCONTINUED | OUTPATIENT
Start: 2023-05-26 | End: 2023-05-27 | Stop reason: HOSPADM

## 2023-05-26 RX ORDER — FENTANYL CITRATE 50 UG/ML
50 INJECTION, SOLUTION INTRAMUSCULAR; INTRAVENOUS EVERY 5 MIN PRN
Status: DISCONTINUED | OUTPATIENT
Start: 2023-05-26 | End: 2023-05-26 | Stop reason: HOSPADM

## 2023-05-26 RX ORDER — BUPIVACAINE HYDROCHLORIDE AND EPINEPHRINE 2.5; 5 MG/ML; UG/ML
INJECTION, SOLUTION INFILTRATION; PERINEURAL PRN
Status: DISCONTINUED | OUTPATIENT
Start: 2023-05-26 | End: 2023-05-26 | Stop reason: HOSPADM

## 2023-05-26 RX ORDER — NALOXONE HYDROCHLORIDE 0.4 MG/ML
0.2 INJECTION, SOLUTION INTRAMUSCULAR; INTRAVENOUS; SUBCUTANEOUS
Status: DISCONTINUED | OUTPATIENT
Start: 2023-05-26 | End: 2023-05-26 | Stop reason: HOSPADM

## 2023-05-26 RX ORDER — ONDANSETRON 4 MG/1
4 TABLET, ORALLY DISINTEGRATING ORAL EVERY 8 HOURS PRN
Qty: 4 TABLET | Refills: 0 | Status: SHIPPED | OUTPATIENT
Start: 2023-05-26

## 2023-05-26 RX ORDER — KETAMINE HYDROCHLORIDE 10 MG/ML
INJECTION INTRAMUSCULAR; INTRAVENOUS PRN
Status: DISCONTINUED | OUTPATIENT
Start: 2023-05-26 | End: 2023-05-26

## 2023-05-26 RX ORDER — ONDANSETRON 2 MG/ML
INJECTION INTRAMUSCULAR; INTRAVENOUS PRN
Status: DISCONTINUED | OUTPATIENT
Start: 2023-05-26 | End: 2023-05-26

## 2023-05-26 RX ORDER — ONDANSETRON 2 MG/ML
4 INJECTION INTRAMUSCULAR; INTRAVENOUS EVERY 30 MIN PRN
Status: DISCONTINUED | OUTPATIENT
Start: 2023-05-26 | End: 2023-05-27 | Stop reason: HOSPADM

## 2023-05-26 RX ORDER — LIDOCAINE HYDROCHLORIDE 20 MG/ML
INJECTION, SOLUTION INFILTRATION; PERINEURAL PRN
Status: DISCONTINUED | OUTPATIENT
Start: 2023-05-26 | End: 2023-05-26

## 2023-05-26 RX ORDER — FENTANYL CITRATE 50 UG/ML
INJECTION, SOLUTION INTRAMUSCULAR; INTRAVENOUS PRN
Status: DISCONTINUED | OUTPATIENT
Start: 2023-05-26 | End: 2023-05-26

## 2023-05-26 RX ORDER — PROPOFOL 10 MG/ML
INJECTION, EMULSION INTRAVENOUS PRN
Status: DISCONTINUED | OUTPATIENT
Start: 2023-05-26 | End: 2023-05-26

## 2023-05-26 RX ORDER — LIDOCAINE 40 MG/G
CREAM TOPICAL
Status: DISCONTINUED | OUTPATIENT
Start: 2023-05-26 | End: 2023-05-26 | Stop reason: HOSPADM

## 2023-05-26 RX ORDER — CEFAZOLIN SODIUM 2 G/50ML
2 SOLUTION INTRAVENOUS SEE ADMIN INSTRUCTIONS
Status: DISCONTINUED | OUTPATIENT
Start: 2023-05-26 | End: 2023-05-26 | Stop reason: HOSPADM

## 2023-05-26 RX ORDER — HYDROXYZINE HYDROCHLORIDE 25 MG/1
25 TABLET, FILM COATED ORAL
Status: DISCONTINUED | OUTPATIENT
Start: 2023-05-26 | End: 2023-05-27 | Stop reason: HOSPADM

## 2023-05-26 RX ORDER — ONDANSETRON 4 MG/1
4 TABLET, ORALLY DISINTEGRATING ORAL
Status: DISCONTINUED | OUTPATIENT
Start: 2023-05-26 | End: 2023-05-27 | Stop reason: HOSPADM

## 2023-05-26 RX ORDER — HYDROMORPHONE HYDROCHLORIDE 1 MG/ML
0.4 INJECTION, SOLUTION INTRAMUSCULAR; INTRAVENOUS; SUBCUTANEOUS EVERY 5 MIN PRN
Status: DISCONTINUED | OUTPATIENT
Start: 2023-05-26 | End: 2023-05-26 | Stop reason: HOSPADM

## 2023-05-26 RX ORDER — OXYCODONE HYDROCHLORIDE 5 MG/1
5-10 TABLET ORAL EVERY 4 HOURS PRN
Qty: 20 TABLET | Refills: 0 | Status: SHIPPED | OUTPATIENT
Start: 2023-05-26

## 2023-05-26 RX ORDER — HYDROMORPHONE HYDROCHLORIDE 1 MG/ML
0.2 INJECTION, SOLUTION INTRAMUSCULAR; INTRAVENOUS; SUBCUTANEOUS EVERY 5 MIN PRN
Status: DISCONTINUED | OUTPATIENT
Start: 2023-05-26 | End: 2023-05-26 | Stop reason: HOSPADM

## 2023-05-26 RX ORDER — OXYCODONE HYDROCHLORIDE 5 MG/1
5 TABLET ORAL ONCE
Status: COMPLETED | OUTPATIENT
Start: 2023-05-26 | End: 2023-05-26

## 2023-05-26 RX ORDER — OXYCODONE HYDROCHLORIDE 5 MG/1
10 TABLET ORAL
Status: DISCONTINUED | OUTPATIENT
Start: 2023-05-26 | End: 2023-05-27 | Stop reason: HOSPADM

## 2023-05-26 RX ADMIN — LIDOCAINE HYDROCHLORIDE 100 MG: 20 INJECTION, SOLUTION INFILTRATION; PERINEURAL at 07:24

## 2023-05-26 RX ADMIN — OXYCODONE HYDROCHLORIDE 5 MG: 5 TABLET ORAL at 10:03

## 2023-05-26 RX ADMIN — PROPOFOL 150 MCG/KG/MIN: 10 INJECTION, EMULSION INTRAVENOUS at 07:24

## 2023-05-26 RX ADMIN — KETOROLAC TROMETHAMINE 30 MG: 30 INJECTION, SOLUTION INTRAMUSCULAR; INTRAVENOUS at 09:26

## 2023-05-26 RX ADMIN — BUPIVACAINE HYDROCHLORIDE 15 ML: 5 INJECTION, SOLUTION EPIDURAL; INTRACAUDAL at 07:00

## 2023-05-26 RX ADMIN — FENTANYL CITRATE 50 MCG: 50 INJECTION, SOLUTION INTRAMUSCULAR; INTRAVENOUS at 07:24

## 2023-05-26 RX ADMIN — GLYCOPYRROLATE 0.2 MG: 0.2 INJECTION, SOLUTION INTRAMUSCULAR; INTRAVENOUS at 07:38

## 2023-05-26 RX ADMIN — CEFAZOLIN SODIUM 2 G: 2 SOLUTION INTRAVENOUS at 07:20

## 2023-05-26 RX ADMIN — PROPOFOL 200 MG: 10 INJECTION, EMULSION INTRAVENOUS at 07:24

## 2023-05-26 RX ADMIN — DEXAMETHASONE SODIUM PHOSPHATE 4 MG: 4 INJECTION, SOLUTION INTRA-ARTICULAR; INTRALESIONAL; INTRAMUSCULAR; INTRAVENOUS; SOFT TISSUE at 07:24

## 2023-05-26 RX ADMIN — FENTANYL CITRATE 25 MCG: 50 INJECTION, SOLUTION INTRAMUSCULAR; INTRAVENOUS at 09:28

## 2023-05-26 RX ADMIN — ACETAMINOPHEN 975 MG: 325 TABLET ORAL at 06:48

## 2023-05-26 RX ADMIN — FENTANYL CITRATE 50 MCG: 50 INJECTION, SOLUTION INTRAMUSCULAR; INTRAVENOUS at 07:31

## 2023-05-26 RX ADMIN — ONDANSETRON 4 MG: 2 INJECTION INTRAMUSCULAR; INTRAVENOUS at 09:00

## 2023-05-26 RX ADMIN — SODIUM CHLORIDE, SODIUM LACTATE, POTASSIUM CHLORIDE, CALCIUM CHLORIDE: 600; 310; 30; 20 INJECTION, SOLUTION INTRAVENOUS at 06:59

## 2023-05-26 RX ADMIN — Medication 0.5 MG: at 08:08

## 2023-05-26 RX ADMIN — KETAMINE HYDROCHLORIDE 25 MG: 10 INJECTION INTRAMUSCULAR; INTRAVENOUS at 07:38

## 2023-05-26 NOTE — OP NOTE
PREOPERATIVE DIAGNOSIS:   1. Left acl tear, chronic  2. Left medial meniscus tear, ? Root tear    POSTOPERATIVE DIAGNOSIS:  1. Left acl tear, chronic  2. Left medial meniscus tear, ? Root tear    PROCEDURE:  1. Examination under anesthesia left Knee  2. left Knee arthroscopy  3. acl reconstruction bone tendon bone autograft  4. Transosseous repair of medial meniscus root    DATE OF SURGERY: 5/26/2023    SURGEON: Sumit Garber MD    ASSISTANT: Cristiane Sanchez PA-C. The assistance of Ms. Sanchez was necessary for positioning, arthroscopic visualization, retraction, graft preparation and graft passage.    RESIDENT OR FELLOW: Leo Martínez MD    OPERATIVE INDICATIONS: Sudhir Mueller is a pleasant 27 year old who I saw through my orthopedic clinic with a history, physical, imaging consistent with left chronic acl tear ane medial meniscus tear.  I reviewed with the patient the risks, benefits, complications, techniques and alternatives to surgery.  We reviewed the expected course of recovery and the potential expected outcomes. We decided together on bone tendon bone autograft acl reconstruction and mensicus surgery  The patient understood both the risks and benefits and desired to proceed despite the risks.    OPERATIVE DETAILS: In the preoperative area the patient's informed consent was reviewed and they desired to proceed.  The left leg was marked and the patient was in agreement.  The patient was taken to the operating room where a timeout was performed and all parties were in agreement.  Preoperative antibiotics were given within 1 hour of the time of incision.  The patient was placed in the supine position and surrendered to LMA anesthesia.  No tourniquet was applied.  Egg crate was placed beneath the well leg and a side post was utilized.  The operative leg was prepped and draped in the usual sterile fashion.     Examination under anesthesia: Range of motion-, 1 quadrant medial and 2 quadrant lateral translation of  "the patella, stable to varus and valgus stress testing, stable posterior drawer testing, 2+ anterior drawer testing, 2B Lachman, 1+ pivot shift    Graft harvest and preparation: A 5 cm midline incision was made and hemostasis was insured with the Bovie electrocautery.  The peritenon was opened longitudinally.  And we selected a section of tendon 10 mm in width.  We then marked on the tibial side 10 x 25 mm.  This was marked with a knife, defined with a Bovie and cut with an oscillating saw it was delivered into the wound with an osteotome.  The knee was brought to full extension where a proximal patellar retractor was placed.  We selected a section of bone 10 mm in width by 20 mm in length it was marked with a knife to find with the Bovie and cut with an oscillating saw.  No malleting was performed of the patella.    The graft was taken to the back table where it was fashioned to a 10 on the femur size 10 on the tibia.  2 drill holes were placed in each of the bone blocks and #2 fiber wires were placed through these holes.  A \"safety stitch\" was placed at the bone tendon interface on the tibial side.  Ink marking pen was used to sumeet the bone tendon interface in the femoral side.  The final graft dimensions showed a 20 mm bone block on the femoral side, a 25 mm bone block on the tibial side and the tibial plus tendon length of 68 mm.    Anterior medial and anterior lateral arthroscopic portals were created and a diagnostic arthroscopy was performed with the following findings: The medial patella facet, lateral patella facet, central ridge of the patella showed normal cartilage.  The trochlear cartilage was normal.  The medial femoral condyle showed grade 2 cartilage and medial tibial plateau showed normal cartilage. The lateral femoral condyle showed normal cartilage and lateral tibial plateau showed normal. The Medial meniscus showed a clear tear of the medial meniscus root and Lateral Meniscus was intact and " stable to probing.  There was a grade 3 rupture of the anterior cruciate ligament with positive empty wall sign.  The PCL was intact.  There was no opening to varus and valgus stress testing in either the lateral or medial compartments, respectively.    A debridement of the nonfunctioning ACL was then performed until we could visualize the anatomic insertion sites of the ACL on both the femoral and the tibial origin.  Remnant preservation was pursued in the tibial side and the tibial tunnel was centered midway between the medial and lateral tibial spines in line with the posterior aspect of the anterior horn of the lateral meniscus.    A tip to tip guide was introduced through the medial portal.  Our osseous length measured 40 mm to accommodate the tibial plus tendon length of our graft.  A 2.4 mm drill to pin was placed into the center of the anatomic insertion of the ACL on the tibial side.  A 10 mm reamer was then placed over this guidepin.  We dilated sequentially from 10-10.5 mm.  A plug was placed on the tibial side.    A spinal needle was then used to localize our accessory medial portal.  Once we are satisfied with its position a Sakina awl was used to select our location along the anatomic insertion of the ACL on the femoral footprint.  A Beath pin was placed.  The knee was hyperflexed.  The pin was advanced through the femur and a 10 mm low-profile reamer was used to ream a 25 mm femoral socket.  Bone debris was removed with motorized suction.  A passing suture was placed which was routed through the tibia.  Notching of the femoral tunnel was then performed.    At this time a we went to prepare the meniscus root to do this we placed an Arthrex passport cannula.  And 2 fiber link sutures were placed in the posterior horn of the medial meniscus.  Excellent control.  Of the meniscus.  Then a 2.4 mm drill tip pin was advanced into the anatomic insertion of the medial meniscus root.  Overreamed with a 5 mm  reamer.  Passing suture was placed.  And traction was applied.  Excellent compression of the meniscus to the tibia was appreciated    The graft was brought up the patellar donor bone block was reduced through the tibial tunnel and dunked into the femur where it was fixed with a 7 x 20 mm metal interference screw.  Excellent purchase of the screw is noted.  The graft was cycled 20 times, maximal manual traction was applied and an 8 x 25 mm screw was placed in the tibial side with excellent purchase.  Lachman 0, no pivot shift, final arthroscopic images showed clearance along the root of the intercondylar notch and extension, clearance along the lateral wall and PCL in flexion.  Good tension to probing.    At this time we performed final fixation of the meniscus to the tibial plateau by applying longitudinal traction and placing the sutures into a 5.5 mm swivel lock.  To do this a 2.4 millimeter drill tip pin was placed.  Reamed with a 5 mm reamer.  Tapping was performed and this fibrillar was placed.  Final imaging showed excellent reduction of the meniscus to the tibia    Copious irrigation was performed an a layered closure was initiated, sterile dressings were applied and the patient was transferred to the recovery room in stable condition with stable vital signs.    ESTIMATED BLOOD LOSS: 25 mL.    TOURNIQUET TIME: No tourniquet was placed.    COMPLICATIONS: None apparent.    DRAINS: None.    SPECIMENS: None.     POSTOPERATIVE PLAN:  1. TTWB x 6 weeks  2. No motion x 1 week, then range of motion 0-90 degrees when sitting or therapy  3. Hinged knee brace on and locked when up and around, off or unlocked for sitting or therapy  4. OK for motion with PT or during home pt sessions during first week  5. At 6 weeks begin WBAT and wean crutches when able  6. No running until 3 months  7. No sports until 6 months, return to game competition at 7-10 months  8. Shower on day 3  9. Start physical therapy day 3-5  10.   mg daily for dvt prophylaxis x 4 weeks

## 2023-05-26 NOTE — ANESTHESIA PROCEDURE NOTES
Adductor canal (Adductor + IPACK) Procedure Note    Pre-Procedure   Staff -        Anesthesiologist:  Chandu Evans MD       Resident/Fellow: Arya Saucedo MD       Performed By: resident       Location: pre-op       Procedure Start/Stop Times: 5/26/2023 7:00 AM       Pre-Anesthestic Checklist: patient identified, IV checked, site marked, risks and benefits discussed, informed consent, monitors and equipment checked, pre-op evaluation, at physician/surgeon's request and post-op pain management  Timeout:       Correct Patient: Yes        Correct Procedure: Yes        Correct Site: Yes        Correct Position: Yes        Correct Laterality: Yes        Site Marked: Yes  Procedure Documentation  Procedure: Adductor canal (Adductor + IPACK)       Laterality: left       Patient Position: sitting       Skin prep: Chloraprep       Needle Type: iORGA Groupy needle       Needle Gauge: 21.        Needle Length (Inches): 3.13        Ultrasound guided       1. Ultrasound was used to identify targeted nerve, plexus, vascular marker, or fascial plane and place a needle adjacent to it in real-time.       2. Ultrasound was used to visualize the spread of anesthetic in close proximity to the above referenced structure.       3. A permanent image is entered into the patient's record.    Assessment/Narrative         The placement was negative for: blood aspirated, painful injection and site bleeding       Paresthesias: No.       Bolus given via needle..        Secured via.        Insertion/Infusion Method: Single Shot       Complications: none    Medication(s) Administered   Bupivacaine 0.5% PF (Infiltration) - Infiltration   15 mL - 5/26/2023 7:00:00 AM  Bupivacaine liposome (Exparel) 1.3% LA inj susp (Infiltration) - Infiltration   10 mL - 5/26/2023 7:00:00 AM  Medication Administration Time: 5/26/2023 7:00 AM      FOR [facility] ([facility]) ONLY:   Pain Team Contact information: please page the Pain Team Via [employer] Search  "\"Pain\". During daytime hours, please page the attending first. At night please page the resident first.      "

## 2023-05-26 NOTE — ANESTHESIA PREPROCEDURE EVALUATION
Anesthesia Pre-Procedure Evaluation    Patient: Sudhir Mueller   MRN: [id number] : 1995        Procedure : Procedure(s):  left knee examination under anesthesia, knee arthroscopy, bone tendon bone autograft anterior cruciate ligament reconstruction  meniscus surgery          No past medical history on file.   Past Surgical History:   Procedure Laterality Date     NO HISTORY OF SURGERY        Allergies   Allergen Reactions     No Known Allergies       Social History     Tobacco Use     Smoking status: Never     Passive exposure: Never     Smokeless tobacco: Never   Vaping Use     Vaping status: Never Used   Substance Use Topics     Alcohol use: Yes     Comment: rarely 1 time per month      Wt Readings from Last 1 Encounters:   23 101.6 kg (224 lb)        Anesthesia Evaluation   Pt has had prior anesthetic.     History of anesthetic complications       ROS/MED HX  ENT/Pulmonary:     (+) sleep apnea,     Neurologic:  - neg neurologic ROS     Cardiovascular:  - neg cardiovascular ROS     METS/Exercise Tolerance:     Hematologic:  - neg hematologic  ROS     Musculoskeletal:  - neg musculoskeletal ROS     GI/Hepatic:     (+) liver disease,     Renal/Genitourinary:       Endo:     (+) type II DM, Obesity,     Psychiatric/Substance Use:  - neg psychiatric ROS     Infectious Disease:       Malignancy:       Other:            Physical Exam    Airway  airway exam normal      Mallampati: III   TM distance: > 3 FB   Neck ROM: full   Mouth opening: > 3 cm    Respiratory Devices and Support     Nasal Canula      Dental       (+) Minor Abnormalities - some fillings, tiny chips      Cardiovascular   cardiovascular exam normal          Pulmonary   pulmonary exam normal                OUTSIDE LABS:  CBC: No results found for: WBC, HGB, HCT, PLT  BMP:   Lab Results   Component Value Date     12/15/2022     2021    POTASSIUM 4.0 12/15/2022    POTASSIUM 4.2 2021    CHLORIDE 103 12/15/2022    CHLORIDE 98  11/09/2021    CO2 24 12/15/2022    CO2 28 11/09/2021    BUN 12.1 12/15/2022    BUN 10 11/09/2021    CR 0.68 12/15/2022    CR 0.60 11/09/2021     (H) 05/26/2023     (H) 12/15/2022     COAGS: No results found for: PTT, INR, FIBR  POC: No results found for: BGM, HCG, HCGS  HEPATIC:   Lab Results   Component Value Date    ALBUMIN 4.2 03/17/2023    PROTTOTAL 7.4 03/17/2023    ALT 28 03/17/2023    AST 20 03/17/2023    ALKPHOS 72 03/17/2023    BILITOTAL 0.2 03/17/2023     OTHER:   Lab Results   Component Value Date    A1C 5.5 03/17/2023    ISAAC 9.2 12/15/2022       Anesthesia Plan    ASA Status:  2   NPO Status:  Will be NPO Appropriate at ...    Anesthesia Type: General.     - Airway: LMA   Induction: Intravenous.   Maintenance: TIVA.        Consents    Anesthesia Plan(s) and associated risks, benefits, and realistic alternatives discussed. Questions answered and patient/representative(s) expressed understanding.     - Discussed: Risks, Benefits and Alternatives for BOTH SEDATION and the PROCEDURE were discussed     - Discussed with:  Patient      - Extended Intubation/Ventilatory Support Discussed: No.      - Patient is DNR/DNI Status: No    Use of blood products discussed: No .     Postoperative Care    Pain management: IV analgesics, Oral pain medications.   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   Discussed with Patient Off-Label use of Liposomal Bupivacaine (Exparel) for Nerve Block.    Relevant risks & benefits were discussed with patient.    All questions were answered and there was agreement to proceed.    Patient signed Off-Label Use of Exparel Consent Form.              Arya Saucedo MD

## 2023-05-26 NOTE — OR NURSING
Patient received left side Adductor nerve block  with Exparel.  Versed 1.5mg given. Tolerated procedure well.

## 2023-05-26 NOTE — ANESTHESIA PROCEDURE NOTES
Airway         Procedure Start/Stop Times: 5/26/2023 7:25 AM  Staff -        CRNA: Emely Ohara       Performed By: CRNA  Consent for Airway        Urgency: elective  Indications and Patient Condition       Indications for airway management: natasha-procedural       Induction type:intravenous       Mask difficulty assessment: 1 - vent by mask    Final Airway Details       Final airway type: supraglottic airway    Supraglottic Airway Details        Type: LMA       Brand: I-Gel       LMA size: 5    Post intubation assessment        Placement verified by: capnometry, equal breath sounds and chest rise        Number of attempts at approach: 1       Number of other approaches attempted: 0       Secured with: silk tape       Ease of procedure: easy       Dentition: Intact and Unchanged    Medication(s) Administered   Medication Administration Time: 5/26/2023 7:25 AM

## 2023-05-26 NOTE — ANESTHESIA CARE TRANSFER NOTE
Patient: Sudhir Mueller    Procedure: Procedure(s):  left knee examination under anesthesia, knee arthroscopy, bone tendon bone autograft anterior cruciate ligament reconstruction  meniscus root repair surgery       Diagnosis: Rupture of anterior cruciate ligament of left knee, initial encounter [S83.512A]  Diagnosis Additional Information: No value filed.    Anesthesia Type:   General     Note:    Oropharynx: oropharynx clear of all foreign objects and spontaneously breathing  Level of Consciousness: awake  Oxygen Supplementation: face mask  Level of Supplemental Oxygen (L/min / FiO2): 6  Independent Airway: airway patency satisfactory and stable  Dentition: dentition unchanged  Vital Signs Stable: post-procedure vital signs reviewed and stable  Report to RN Given: handoff report given  Patient transferred to: PACU    Handoff Report: Identifed the Patient, Identified the Reponsible Provider, Reviewed the pertinent medical history, Discussed the surgical course, Reviewed Intra-OP anesthesia mangement and issues during anesthesia, Set expectations for post-procedure period and Allowed opportunity for questions and acknowledgement of understanding      Vitals:  Vitals Value Taken Time   /64 05/26/23 0943   Temp 98.4    Pulse 100 05/26/23 0946   Resp 8 05/26/23 0946   SpO2 98 % 05/26/23 0946   Vitals shown include unvalidated device data.    Electronically Signed By: SAGRARIO TORRE  May 26, 2023  9:47 AM

## 2023-05-26 NOTE — ANESTHESIA POSTPROCEDURE EVALUATION
Patient: Sudhir Mueller    Procedure: Procedure(s):  left knee examination under anesthesia, knee arthroscopy, bone tendon bone autograft anterior cruciate ligament reconstruction  meniscus root repair surgery       Anesthesia Type:  General    Note:  Disposition: Outpatient   Postop Pain Control: Uneventful            Sign Out: Well controlled pain   PONV: No   Neuro/Psych: Uneventful            Sign Out: Acceptable/Baseline neuro status   Airway/Respiratory: Uneventful            Sign Out: Acceptable/Baseline resp. status   CV/Hemodynamics: Uneventful            Sign Out: Acceptable CV status; No obvious hypovolemia; No obvious fluid overload   Other NRE: NONE   DID A NON-ROUTINE EVENT OCCUR? No           Last vitals:  Vitals Value Taken Time   /65 05/26/23 1000   Temp 36.7  C (98  F) 05/26/23 0942   Pulse 85 05/26/23 1004   Resp 20 05/26/23 1004   SpO2 98 % 05/26/23 1006   Vitals shown include unvalidated device data.    Electronically Signed By: Chandu Evans MD, MD  May 26, 2023  10:14 AM

## 2023-05-26 NOTE — DISCHARGE INSTRUCTIONS
"Information about liposomal bupivacaine (Exparel)    What is Liposomal Bupivacaine?    Liposomal Bupivacaine is a numbing medication that can help you manage your pain after surgery.  This medication is similar to \"novacaine,\" which is often used by the dentist.  Liposomal bupivacaine is released slowly and can help control pain for up to 72 hours.    What is the purpose of Liposomal Bupivacaine?  To manage your pain after surgery  To help you sleep better, take deep breaths, walk more comfortable, and feel up to visiting with others    How is the procedure done?  Liposomal bupivacaine is a medication given by an injection.  It is usually given right before your surgery.  If this is the case, you will be awake or sedated, but you should experience minimal pain during the procedure.  For some people, the injection may be given at the very end of your surgery.  It all depends on the type of surgery and your situation.  The procedure usually takes about 5-15 minutes.  An ultrasound machine will help the anesthesiologist insert it in the right place or the surgeon will inject it under direct vision.   A needle is used to place the numbing medication under your skin.  It provides pain relief by numbing the tissue in the area where your surgeon will make the incision.    What can I expect?  You may experience numbness, tingling, or a feeling of heaviness around the area that was injected.  If you experience any of the follow symptoms IMMEDIATELY CALL THE REGIONAL ANEST[facility]IA PAIN SERVICE:  Numbness or tingling occurs in areas other than around the injection site  Blurry vision  Ringing in your ears  A metallic taste in your mouth    CALL the Regional Anesthesia Pain Service at:  [phone].  Press \"4\" for the  and let the hospital  know that you are having a problem with a nerve block and that you would like to page the \"adult care inpatient pain management/[facility] team\".  From 7 am - 5 pm, page " "the \"staff\" physician  From 5 pm - 7 am, page the \"resident\" physician (if no response from \"resident\" physician, call the  back and the \"staff\" physician).    You should not receive any other type of numbing medication within 4 days after receiving liposomal bupivacaine unless your anesthesiologist approves.    Post Operative Instructions: Regional Anesthetic for Lower Extremity with Liposomal Bupivacaine  General Information:   Regional anesthesia is when local anesthetic or  numbing  medication is injected around the nerves to anesthetize or  numb  the area supplied by that set of nerves. It is a type of analgesia used to control pain and decreases the need for narcotics following surgery.    Types of Regional Blocks:  Femoral: A block injected into the groin area of the operative leg of a patient having thigh or knee surgery.  Adductor Canal: A block injected into the mid thigh of the operative leg of a patient having knee or ankle surgery.  Popliteal or Distal Sciatic: A block injected into the back of the knee of the operative leg of patients having foot or ankle surgery.   Ankle:  An anesthetic medication is injected into the ankle of the operative leg of a patient having foot or toe surgery.     Procedure:   The type of anesthesia your doctor used to numb your leg will usually not wear off for 24-48 hours, but may last as long as 72 hours. You should be careful during that period, since it is possible to injure your leg without being aware of the injury. While your leg is numb you should:  Use crutches (minimal weight bearing until your motor and strength is completely back to normal)  Avoid striking or bumping your leg  Avoid extreme hot or cold    Discomfort:  You will have a tingling and prickly sensation in your leg as the feeling begins to return; you can also expect some discomfort. The amount of discomfort is unpredictable, but if you have more pain than can be controlled with pain medication, " "you should notify your physician.     Pain Medicine:   Begin taking your oral pain pills before bedtime and during the night to avoid a sudden onset of pain as part of the block wears off. Do not engage in drinking, driving, or hazardous occupations while taking pain medication.         Safety Tips for Using Crutches    Crutch Fit:  Assume good standing posture with shoulders relaxed and crutch tips 6-8 inches out from the side of the foot.  The underarm pad should fall 2-3 fingers width below the armpit.  The handgrip is positioned level with the wrist to allow 30  flexion at the elbow.    Safety Tips:  Bear weight on your hands, not on your armpits.  Do not add extra padding to the underarm pad. This will, in effect, lengthen the crutches and increase risk of nerve injury.  Wear flat, properly fitting shoes. Do not walk in stocking feet, high heels or slippers.  Household hazards:  --Throw rugs should be removed from floors.  --Stairs should be cleared of obstacles.  --Use extra caution on slippery, highly polished, littered or uneven floor surfaces.  --Check for electric cords.  Check crutch tips for excessive wear and keep wing nuts tight.  While walking, look forward with  head up  and  eyes open.  Take equal length steps.  Use BOTH crutches.    Stairs Sequence:  UP: \"Good\" leg first, followed by  bad  leg, then crutches.  DOWN: Crutches, followed by  bad  leg, \"good\" leg.     Walking with Crutches:  Move both crutches forward at the same time.  Non-Weight Bearing (NWB):  Hold the involved leg up and swing through the crutches with the involved leg. The involved leg does not touch the floor.  Toe Touch Weight Bearing (TTWB): Move the involved leg forward. Rest it lightly on the floor for balance only. Step through the crutches with the uninvolved leg.  Partial Weight Bearing (PWB): Move the involved leg forward. Step down the weight of the leg only.  Step through the crutches with the uninvolved leg.  Weight " "Bearing As Tolerated (WBAT): Move the involved leg forward. Put as much pressure through the involved leg as you can tolerate comfortably. Then step through the crutches with the uninvolved leg.    [facility]  Home Care Following Anesthesia  For 24 hours after surgery:  Get plenty of rest.  A responsible adult must stay with you for at least 24 hours after you leave the surgery center.  Do not drive or use heavy equipment.  If you have weakness or tingling, don't drive or use heavy equipment until this feeling goes away.   Do not drink alcohol.   Avoid strenuous or risky activities.  Ask for help when climbing stairs.  You may feel lightheaded.  IF so, sit for a few minutes before standing.  Have someone help you get up.   If you have nausea (feel sick to your stomach): Drink only clear liquids such as apple juice, ginger ale, broth or 7-Up.  Rest may also help.  Be sure to drink enough fluids.  Move to a regular diet as you feel able.   You may have a slight fever.  Call the doctor if your fever is over 100 F (37.7 C) (taken under the tongue) or lasts longer than 24 hours.  You may have a dry mouth, a sore throat, muscle aches or trouble sleeping. These should go away after 24 hours.  Do not make important or legal decisions.   It is recommended to avoid smoking.        Today you received an Exparel block to numb the nerves near your surgery site.  This is a block using local anesthetic or \"numbing\" medication injected around the nerves to anesthetize or \"numb\" the area supplied by those nerves.  This block is injected into the muscle layer near your surgical site.  This medication may numb the location where you had surgery up to 72 hours.  If your surgical site is an arm or leg you should be careful with your affected limb, since it is possible to injure your limb without being aware of it due to the numbing.  Until full feeling returns, you should guard against bumping or " hitting your limb, and avoid extreme hot or cold temperatures on the skin.  As the block wears off, the feeling will return as a tingling or prickly sensation near your surgical site.  You will experince more discomfort from your incision as the feeling returns.  You may want to take a pain pill (a narcotic or Tylenol if this was prescribed by your surgeon) when you start to experience mild pain before the pain beomes more severe.  If your pain medications do not control your pain, you should notify your surgeon.    Tips for taking pain medications  To get the best pain relief possible, remember these points:  Take pain medications as directed, before pain becomes severe.  Pain medication can upset your stomach: taking it with food may help.  Constipation is a common side effect of pain medication. Drink plenty of  fluids.  Eat foods high in fiber. Take a stool softener if recommended by your doctor or pharmacist.  Do not drink alcohol, drive or operate machinery while taking pain medications.  Ask about other ways to control pain, such as with heat, ice or relaxation.    Tylenol/Acetaminophen Consumption  To help encourage the safe use of acetaminophen, the makers of TYLENOL  have lowered the maximum daily dose for single-ingredient Extra Strength TYLENOL  (acetaminophen) products sold in the [place] from 8 pills per day (4,000 mg) to 6 pills per day (3,000 mg). The dosing interval has also changed from 2 pills every 4-6 hours to 2 pills every 6 hours.  If you feel your pain relief is insufficient, you may take Tylenol/Acetaminophen in addition to your narcotic pain medication.   Be careful not to exceed 3,000 mg of Tylenol/Acetaminophen in a 24 hour period from all sources.  If you are taking extra strength Tylenol/acetaminophen (500 mg), the maximum dose is 6 tablets in 24 hours.  If you are taking regular strength acetaminophen (325 mg), the maximum dose is 9 tablets in 24 hours.  You were given 975mg of Tylenol at  6:50 am, you may take Tylenol again at 12:50 pm.  You received a medication like Ibuprofen at 9:30 am, you may take Ibuprofen again at 3:30 pm.    Call a doctor for any of the following:  Signs of infection (fever, growing tenderness at the surgery site, a large amount of drainage or bleeding, severe pain, foul-smelling drainage, redness, swelling).  It has been over 8 to 10 hours since surgery and you are still not able to urinate (pass water).  Headache for over 24 hours.  Numbness, tingling or weakness the day after surgery (if you had spinal anesthesia).  Signs of Covid-19 infection (temperature over 100 degrees, shortness of breath, cough, loss of taste/smell, generalized body aches, persistent headache, chills, sore throat, nausea/vomiting/diarrhea)  Your doctor is:       Dr. Sumit Garber, Orthopaedics: [phone]               Or dial [phone] and ask for the resident on call for:  Orthopaedics  For emergency care, call the:  [facility] Emergency Department: [phone] (TTY for hearing impaired: [phone])

## 2023-05-29 ENCOUNTER — TELEPHONE (OUTPATIENT)
Dept: NURSING | Facility: CLINIC | Age: 28
End: 2023-05-29
Payer: COMMERCIAL

## 2023-05-29 NOTE — TELEPHONE ENCOUNTER
Telephone Call:     Pt calling to review some items with a nurse on his AVS from knee surgery he had on Friday about showering and constipation.     Writer was able to answer patient's questions for him by utilizing the AVS in patient's chart.     Daisha Mckeon RN  [facility] Nurse Advisor 10:37 AM 5/29/2023

## 2023-05-31 ENCOUNTER — TELEPHONE (OUTPATIENT)
Dept: ORTHOPEDICS | Facility: CLINIC | Age: 28
End: 2023-05-31
Payer: COMMERCIAL

## 2023-05-31 ASSESSMENT — ACTIVITIES OF DAILY LIVING (ADL)
GO UP STAIRS: ACTIVITY IS VERY DIFFICULT
WALK: ACTIVITY IS VERY DIFFICULT
GIVING WAY, BUCKLING OR SHIFTING OF KNEE: THE SYMPTOM PREVENTS ME FROM ALL DAILY ACTIVITIES
SWELLING: THE SYMPTOM PREVENTS ME FROM ALL DAILY ACTIVITIES
SWELLING: THE SYMPTOM PREVENTS ME FROM ALL DAILY ACTIVITIES
RISE FROM A CHAIR: I AM UNABLE TO DO THE ACTIVITY
GO UP STAIRS: ACTIVITY IS VERY DIFFICULT
SIT WITH YOUR KNEE BENT: I AM UNABLE TO DO THE ACTIVITY
GIVING WAY, BUCKLING OR SHIFTING OF KNEE: THE SYMPTOM PREVENTS ME FROM ALL DAILY ACTIVITIES
GO DOWN STAIRS: ACTIVITY IS VERY DIFFICULT
SIT WITH YOUR KNEE BENT: I AM UNABLE TO DO THE ACTIVITY
HOW_WOULD_YOU_RATE_THE_CURRENT_FUNCTION_OF_YOUR_KNEE_DURING_YOUR_USUAL_DAILY_ACTIVITIES_ON_A_SCALE_FROM_0_TO_100_WITH_100_BEING_YOUR_LEVEL_OF_KNEE_FUNCTION_PRIOR_TO_YOUR_INJURY_AND_0_BEING_THE_INABILITY_TO_PERFORM_ANY_OF_YOUR_USUAL_DAILY_ACTIVITIES?: 0
HOW_WOULD_YOU_RATE_THE_CURRENT_FUNCTION_OF_YOUR_KNEE_DURING_YOUR_USUAL_DAILY_ACTIVITIES_ON_A_SCALE_FROM_0_TO_100_WITH_100_BEING_YOUR_LEVEL_OF_KNEE_FUNCTION_PRIOR_TO_YOUR_INJURY_AND_0_BEING_THE_INABILITY_TO_PERFORM_ANY_OF_YOUR_USUAL_DAILY_ACTIVITIES?: 0
PLEASE_INDICATE_YOR_PRIMARY_REASON_FOR_REFERRAL_TO_THERAPY:: KNEE
STIFFNESS: THE SYMPTOM PREVENTS ME FROM ALL DAILY ACTIVITIES
LIMPING: THE SYMPTOM AFFECTS MY ACTIVITY SEVERELY
SQUAT: I AM UNABLE TO DO THE ACTIVITY
GO DOWN STAIRS: ACTIVITY IS VERY DIFFICULT
STIFFNESS: THE SYMPTOM PREVENTS ME FROM ALL DAILY ACTIVITIES
KNEEL ON THE FRONT OF YOUR KNEE: I AM UNABLE TO DO THE ACTIVITY
STAND: ACTIVITY IS VERY DIFFICULT
SQUAT: I AM UNABLE TO DO THE ACTIVITY
WEAKNESS: THE SYMPTOM PREVENTS ME FROM ALL DAILY ACTIVITIES
KNEE_ACTIVITY_OF_DAILY_LIVING_SUM: 5
STAND: ACTIVITY IS VERY DIFFICULT
RAW_SCORE: 5
KNEE_ACTIVITY_OF_DAILY_LIVING_SCORE: 7.14
WALK: ACTIVITY IS VERY DIFFICULT
AS_A_RESULT_OF_YOUR_KNEE_INJURY,_HOW_WOULD_YOU_RATE_YOUR_CURRENT_LEVEL_OF_DAILY_ACTIVITY?: SEVERELY ABNORMAL
KNEEL ON THE FRONT OF YOUR KNEE: I AM UNABLE TO DO THE ACTIVITY
WEAKNESS: THE SYMPTOM PREVENTS ME FROM ALL DAILY ACTIVITIES
PAIN: THE SYMPTOM PREVENTS ME FROM ALL DAILY ACTIVITIES
HOW_WOULD_YOU_RATE_THE_OVERALL_FUNCTION_OF_YOUR_KNEE_DURING_YOUR_USUAL_DAILY_ACTIVITIES?: SEVERELY ABNORMAL
HOW_WOULD_YOU_RATE_THE_OVERALL_FUNCTION_OF_YOUR_KNEE_DURING_YOUR_USUAL_DAILY_ACTIVITIES?: SEVERELY ABNORMAL
PAIN: THE SYMPTOM PREVENTS ME FROM ALL DAILY ACTIVITIES
RISE FROM A CHAIR: I AM UNABLE TO DO THE ACTIVITY
LIMPING: THE SYMPTOM AFFECTS MY ACTIVITY SEVERELY
AS_A_RESULT_OF_YOUR_KNEE_INJURY,_HOW_WOULD_YOU_RATE_YOUR_CURRENT_LEVEL_OF_DAILY_ACTIVITY?: SEVERELY ABNORMAL

## 2023-05-31 NOTE — TELEPHONE ENCOUNTER
I spoke with the patient and I let him know that he can take off the gauze and cotton padding on his knee. He should leave the steri-strips on and he can wrap the knee over the steri-strips with a tubi- or ace bandage prior to his PT appointment tomorrow. I let him know that at his appointment on 6/7 with Cristiane they will look at the incision and remove steri-strips. He understood this and had no further questions at this time.     KRIS Low

## 2023-05-31 NOTE — TELEPHONE ENCOUNTER
Health Call Center    Phone Message    May a detailed message be left on voicemail: yes     Reason for Call: Symptoms or Concerns       Current symptom or concern: Patient is experiencing swelling and pain around procedure area. He is unsure if he should remove the bandage or leave it on. Please contact the patient back to discuss at [phone].     Symptoms have been present for:  few day(s)    Has patient previously been seen for this? No    By : Dr. Garber    Date: 5/31/23    Are there any new or worsening symptoms? Yes: New after procedure      Action Taken: Message routed to:  [facility] ([facility]): [phone]    Travel Screening: Not Applicable

## 2023-06-01 ENCOUNTER — THERAPY VISIT (OUTPATIENT)
Dept: PHYSICAL THERAPY | Facility: REHABILITATION | Age: 28
End: 2023-06-01
Attending: ORTHOPAEDIC SURGERY
Payer: COMMERCIAL

## 2023-06-01 DIAGNOSIS — S83.512A RUPTURE OF ANTERIOR CRUCIATE LIGAMENT OF LEFT KNEE, INITIAL ENCOUNTER: ICD-10-CM

## 2023-06-01 PROCEDURE — 97161 PT EVAL LOW COMPLEX 20 MIN: CPT | Mod: GP | Performed by: PHYSICAL THERAPIST

## 2023-06-01 PROCEDURE — 97110 THERAPEUTIC EXERCISES: CPT | Mod: GP | Performed by: PHYSICAL THERAPIST

## 2023-06-01 NOTE — PROGRESS NOTES
PHYSICAL THERAPY EVALUATION  Type of Visit: Evaluation    See electronic medical record for Abuse and Falls Screening details.    Subjective      Presenting condition or subjective complaint: ACL Reconstruction recovery process.  Date of onset: 05/26/23    Relevant medical history: Diabetes   Dates & types of surgery: 05/26/2023    Prior diagnostic imaging/testing results: MRI; X-ray     Prior therapy history for the same diagnosis, illness or injury: No      Prior Level of Function   Transfers: Independent  Ambulation: Independent  ADL: Independent  IADL: Driving, Housekeeping, Work    Living Environment  Social support: With family members   Type of home: House; Townhome; 2-story   Stairs to enter the home: Yes 2 Is there a railing: Yes   Ramp: No   Stairs inside the home: Yes 2 Is there a railing: Yes   Help at home: Self Cares (home health aide/personal care attendant, family, etc)  Equipment owned: Walker; Crutches     Employment: Yes   Hobbies/Interests: Sports, gym, outdoor cardio.    Patient goals for therapy: Run and play sports.    Pain assessment: Pain present     Objective   KNEE EVALUATION  PAIN: Pain Level at Rest: 1/10  Pain is Relieved By: cold and meds  INTEGUMENTARY (edema, incisions): Incisions still covered by dressing but no fresh blood could be visualized through the dressing. He demonstrates mild to moderate edema  with 50.5cm suprapatellar and 44.5 cm around tibial tuberosity  GAIT:   Weightbearing Status: TTWB  Assistive Device(s): Brace, Crutches (bilateral)  Gait Deviations: Antalgic  ROM:   (Degrees) Right AROM Right PROM   Knee Flexion 74 76   Knee Extension -5 -5   Pain: Pressure when at flexion end range  End feel: Open  Strength: NT    50.5 cm, 44.9 cm  Assessment & Plan   CLINICAL IMPRESSIONS   Medical Diagnosis: Rupture of anterior cruciate ligament of left knee, initial encounter    Treatment Diagnosis: Decreased activity tolerance, decreased weight bearing  tolerance, altered gait pattern   Impression/Assessment: Patient is a 27 year old male who presents to therapy after an ACL reconstruction.  The following significant findings have been identified: Pain, Decreased ROM/flexibility, Decreased strength, Impaired balance, Decreased proprioception, Edema, Impaired gait, Impaired muscle performance and Decreased activity tolerance. These impairments interfere with their ability to perform self care tasks, work tasks, recreational activities, household chores, driving , household mobility and community mobility as compared to previous level of function.     Clinical Decision Making (Complexity):   Clinical Presentation: Stable/Uncomplicated  Clinical Presentation Rationale: based on medical and personal factors listed in PT evaluation  Clinical Decision Making (Complexity): Low complexity    PLAN OF CARE  Treatment Interventions:  Modalities: Cryotherapy, Dry Needling  Interventions: Gait Training, Manual Therapy, Neuromuscular Re-education, Therapeutic Activity, Therapeutic Exercise, Self-Care/Home Management, Wound Therapy    Long Term Goals     PT Goal 1  Goal Identifier: HEP  Goal Description: Sudhir will be independent in their HEP in order to facilitate return to prior level of function.  Goal Progress: In progress  Target Date: 08/30/23  PT Goal 2  Goal Identifier: Gait pattern  Goal Progress: TTWB with bilateral crutches  Target Date: 08/30/23  PT Goal 3  Goal Identifier: Knee ROM  Goal Description: Sudhir will increase knee ROM to >/= 0-130 without pain in order to improve the quality of their ADLs.  Goal Progress: -5 to 74  Target Date: 08/30/23  PT Goal 4  Goal Identifier: Knee strength  Goal Description: Sudhir will demonstrate knee strength of >/=5/5 without pain in order to improve the ease of their ADLs.  Goal Progress: NT  Target Date: 08/30/23      Frequency of Treatment: 1 x/week  Duration of Treatment: 90 days    Education Assessment:   Learner/Method:  Patient  Education Comments: Education on brace use, exercises, and TTWB    Risks and benefits of evaluation/treatment have been explained.   Patient/Family/caregiver agrees with Plan of Care.     Evaluation Time:     PT Eval, Low Complexity Minutes (14033): 17  Signing Clinician: EDGAR HINES [facility]                                                                                   OUTPATIENT PHYSICAL THERAPY      PLAN OF TREATMENT FOR OUTPATIENT REHABILITATION   Patient's Last Name, First Name, M.ISudhir Monreal    YOB: 1995   Provider's Name   [facility]   Medical Record No.  [id number]     Onset Date: 05/26/23  Start of Care Date: 06/01/23     Medical Diagnosis:  Rupture of anterior cruciate ligament of left knee, initial encounter      PT Treatment Diagnosis:  Decreased activity tolerance, decreased weight bearing tolerance, altered gait pattern Plan of Treatment  Frequency/Duration: 1 x/week/ 90 days    Certification date from 06/01/23 to 08/30/23         See note for plan of treatment details and functional goals     [facility] PT                         I CERTIFY THE NEED FOR THESE SERVICES FURNISHED UNDER        THIS PLAN OF TREATMENT AND WHILE UNDER MY CARE     (Physician attestation of this document indicates review and certification of the therapy plan).                  Referring Provider:  Sumit Garber MD      Initial Assessment  See Epic Evaluation- Start of Care Date: 06/01/23

## 2023-06-07 ENCOUNTER — OFFICE VISIT (OUTPATIENT)
Dept: ORTHOPEDICS | Facility: CLINIC | Age: 28
End: 2023-06-07
Payer: COMMERCIAL

## 2023-06-07 DIAGNOSIS — Z48.89 ENCOUNTER FOR POSTOPERATIVE CARE: Primary | ICD-10-CM

## 2023-06-07 PROCEDURE — 99024 POSTOP FOLLOW-UP VISIT: CPT

## 2023-06-07 NOTE — NURSING NOTE
Reason For Visit:   Chief Complaint   Patient presents with     RECHECK     2 weeks post op from left knee arthroscopy bone tendon bone autograft ACL reconstruction. Left meniscus root repair surgery. DOS: 05/26/2023 with Dr. Garber.       There were no vitals taken for this visit.    Pain Assessment  Patient Currently in Pain: Kishore Adames, EMT

## 2023-06-07 NOTE — LETTER
6/7/2023         RE: Sudhir Mueller  [address] [address]        Dear Colleague,    Thank you for referring your patient, Sudhir Mueller, to the [facility] [place]. Please see a copy of my visit note below.    Chief Complaint:   Follow up, DOS 5/26/23 with Dr. Garber    Procedures:  Examination under anesthesia left Knee  left Knee arthroscopy  acl reconstruction bone tendon bone autograft  Transosseous repair of medial meniscus root    History:  Sudhir Mueller is a 27 year old patient s/p above procedure, here for follow up. He has done very well since surgery. Taking tylenol for pain control, has gone 2 days without needing oxycodone. Working with physical therapy. Plans to return to work in ~ 6 weeks. No concerns today.       Exam:     General: Awake, Alert, and oriented. Articulates and communicates with a normal affect     Left Lower Extremity:  Incisions well healed without evidence of infection, no erythema, drainage, or wound dehiscence   Normal post-operative effusion and ecchymosis  Range of motion and stability exam not performed  TA/Gsc/EHL/FHL with 5/5 strength  Distal pulses palpable, toes are warm and well perfused       Imaging:  No new imaging.     Medications:   -Oxycodone 5 mg: Taking PRN, no refills  -Acetaminophen 325 mg: Taking every 6 hours  - mg daily: Taking as prescribed for DVT prophylaxis     Assessment:  Doing well 2 weeks s/p left knee arthroscopy, ACL reconstruction BTB autograft, medial meniscus root repair with Dr. Garber. Basic wound cares were performed today, I did not appreciate signs of infection. We discussed the plan below, all questions were answered to the best of my ability.     Plan:   -Weight bearing: TTWB x 6 weeks  -Range of motion: 0-90  -Brace: Hinged knee brace on and locked in extension while up. Ok to have brace off or unlocked while sitting or with therapy.   -Continue work with physical therapy   -DVT prophylaxis:   mg daily x 4 weeks   -Follow up: 6 weeks with Dr. Jcarlos Sanchez PA-C 6/7/2023 3:51 PM  Orthopedic Surgery

## 2023-06-07 NOTE — PROGRESS NOTES
Chief Complaint:   1. Follow up, DOS 5/26/23 with Dr. Garber    Procedures:  1. Examination under anesthesia left Knee  2. left Knee arthroscopy  3. acl reconstruction bone tendon bone autograft  4. Transosseous repair of medial meniscus root    History:  Sudhir Mueller is a 27 year old patient s/p above procedure, here for follow up. He has done very well since surgery. Taking tylenol for pain control, has gone 2 days without needing oxycodone. Working with physical therapy. Plans to return to work in ~ 6 weeks. No concerns today.       Exam:     General: Awake, Alert, and oriented. Articulates and communicates with a normal affect     Left Lower Extremity:    Incisions well healed without evidence of infection, no erythema, drainage, or wound dehiscence     Normal post-operative effusion and ecchymosis    Range of motion and stability exam not performed    TA/Gsc/EHL/FHL with 5/5 strength    Distal pulses palpable, toes are warm and well perfused       Imaging:  No new imaging.     Medications:   -Oxycodone 5 mg: Taking PRN, no refills  -Acetaminophen 325 mg: Taking every 6 hours  - mg daily: Taking as prescribed for DVT prophylaxis     Assessment:  Doing well 2 weeks s/p left knee arthroscopy, ACL reconstruction BTB autograft, medial meniscus root repair with Dr. Garber. Basic wound cares were performed today, I did not appreciate signs of infection. We discussed the plan below, all questions were answered to the best of my ability.     Plan:   -Weight bearing: TTWB x 6 weeks  -Range of motion: 0-90  -Brace: Hinged knee brace on and locked in extension while up. Ok to have brace off or unlocked while sitting or with therapy.   -Continue work with physical therapy   -DVT prophylaxis:  mg daily x 4 weeks   -Follow up: 6 weeks with Dr. Jcarlos Sanchez PA-C 6/7/2023 3:51 PM  Orthopedic Surgery

## 2023-06-09 ENCOUNTER — TELEPHONE (OUTPATIENT)
Dept: ORTHOPEDICS | Facility: CLINIC | Age: 28
End: 2023-06-09
Payer: COMMERCIAL

## 2023-06-09 NOTE — TELEPHONE ENCOUNTER
[facility] Call Center    Phone Message    May a detailed message be left on voicemail: yes     Reason for Call: Other: Pt recvd a letter stating he needs to give clinic notes test results dx to his disability claim so is asking for help with this     Action Taken: Other: ortho [facility]    Travel Screening: Not Applicable

## 2023-06-12 ENCOUNTER — THERAPY VISIT (OUTPATIENT)
Dept: PHYSICAL THERAPY | Facility: REHABILITATION | Age: 28
End: 2023-06-12
Payer: COMMERCIAL

## 2023-06-12 DIAGNOSIS — M25.362 KNEE INSTABILITY, LEFT: ICD-10-CM

## 2023-06-12 DIAGNOSIS — S83.512A RUPTURE OF ANTERIOR CRUCIATE LIGAMENT OF LEFT KNEE, INITIAL ENCOUNTER: Primary | ICD-10-CM

## 2023-06-12 PROCEDURE — 97110 THERAPEUTIC EXERCISES: CPT | Mod: GP | Performed by: PHYSICAL THERAPIST

## 2023-06-19 ENCOUNTER — THERAPY VISIT (OUTPATIENT)
Dept: PHYSICAL THERAPY | Facility: REHABILITATION | Age: 28
End: 2023-06-19
Attending: ORTHOPAEDIC SURGERY
Payer: COMMERCIAL

## 2023-06-19 DIAGNOSIS — S83.512A RUPTURE OF ANTERIOR CRUCIATE LIGAMENT OF LEFT KNEE, INITIAL ENCOUNTER: Primary | ICD-10-CM

## 2023-06-19 DIAGNOSIS — M25.362 KNEE INSTABILITY, LEFT: ICD-10-CM

## 2023-06-19 PROCEDURE — 97110 THERAPEUTIC EXERCISES: CPT | Mod: GP | Performed by: PHYSICAL THERAPIST

## 2023-06-19 PROCEDURE — 97140 MANUAL THERAPY 1/> REGIONS: CPT | Mod: GP | Performed by: PHYSICAL THERAPIST

## 2023-06-29 ENCOUNTER — THERAPY VISIT (OUTPATIENT)
Dept: PHYSICAL THERAPY | Facility: REHABILITATION | Age: 28
End: 2023-06-29
Attending: ORTHOPAEDIC SURGERY
Payer: COMMERCIAL

## 2023-06-29 DIAGNOSIS — M25.362 KNEE INSTABILITY, LEFT: ICD-10-CM

## 2023-06-29 DIAGNOSIS — S83.512A RUPTURE OF ANTERIOR CRUCIATE LIGAMENT OF LEFT KNEE, INITIAL ENCOUNTER: Primary | ICD-10-CM

## 2023-06-29 PROCEDURE — 97110 THERAPEUTIC EXERCISES: CPT | Mod: GP | Performed by: PHYSICAL THERAPIST

## 2023-06-29 PROCEDURE — 97140 MANUAL THERAPY 1/> REGIONS: CPT | Mod: GP | Performed by: PHYSICAL THERAPIST

## 2023-07-11 ENCOUNTER — THERAPY VISIT (OUTPATIENT)
Dept: PHYSICAL THERAPY | Facility: REHABILITATION | Age: 28
End: 2023-07-11
Payer: COMMERCIAL

## 2023-07-11 DIAGNOSIS — S83.512A RUPTURE OF ANTERIOR CRUCIATE LIGAMENT OF LEFT KNEE, INITIAL ENCOUNTER: Primary | ICD-10-CM

## 2023-07-11 DIAGNOSIS — M25.362 KNEE INSTABILITY, LEFT: ICD-10-CM

## 2023-07-11 PROCEDURE — 97110 THERAPEUTIC EXERCISES: CPT | Mod: GP | Performed by: PHYSICAL THERAPIST

## 2023-07-11 PROCEDURE — 97140 MANUAL THERAPY 1/> REGIONS: CPT | Mod: GP | Performed by: PHYSICAL THERAPIST

## 2023-07-11 PROCEDURE — 97530 THERAPEUTIC ACTIVITIES: CPT | Mod: GP | Performed by: PHYSICAL THERAPIST

## 2023-07-14 DIAGNOSIS — Z98.890 S/P ACL RECONSTRUCTION: Primary | ICD-10-CM

## 2023-07-17 ENCOUNTER — OFFICE VISIT (OUTPATIENT)
Dept: ORTHOPEDICS | Facility: CLINIC | Age: 28
End: 2023-07-17
Payer: COMMERCIAL

## 2023-07-17 ENCOUNTER — ANCILLARY PROCEDURE (OUTPATIENT)
Dept: GENERAL RADIOLOGY | Facility: CLINIC | Age: 28
End: 2023-07-17
Attending: ORTHOPAEDIC SURGERY
Payer: COMMERCIAL

## 2023-07-17 VITALS — BODY MASS INDEX: 42.29 KG/M2 | HEIGHT: 61 IN | WEIGHT: 224 LBS

## 2023-07-17 DIAGNOSIS — Z98.890 S/P ACL RECONSTRUCTION: ICD-10-CM

## 2023-07-17 DIAGNOSIS — Z98.890 S/P ACL RECONSTRUCTION: Primary | ICD-10-CM

## 2023-07-17 PROCEDURE — 73560 X-RAY EXAM OF KNEE 1 OR 2: CPT | Mod: LT | Performed by: RADIOLOGY

## 2023-07-17 PROCEDURE — 99024 POSTOP FOLLOW-UP VISIT: CPT | Performed by: ORTHOPAEDIC SURGERY

## 2023-07-17 NOTE — LETTER
7/17/2023         RE: Sudhir Mueller  [address] [facility] [address]        Dear Colleague,    Thank you for referring your patient, Sudhir Mueller, to the [facility] [place]. Please see a copy of my visit note below.    Chief Complaint:   ACL tear, medial meniscus root tear    Procedures:  Examination under anesthesia left Knee  left Knee arthroscopy  acl reconstruction bone tendon bone autograft  Transosseous repair of medial meniscus root  DOS 5/26/23    History:  Doing well, pain controlled, doing PT, off pain meds       Exam:     General: Awake, Alert, and oriented. Articulates and communicates with a normal affect     Left Lower Extremity:  Incisions well healed without evidence of infection, no erythema, drainage, or wound dehiscence   No post-operative effusion or ecchymosis  Range of motion 0-90+ degrees  stability exam not performed  TA/Gsc/EHL/FHL with 5/5 strength  Distal pulses palpable, toes are warm and well perfused       Imaging:  Ap and lateral radiographs show tunnels and hardware in good position.     Medications:   None     Assessment:  6 weeks following btb acl    Plan:   Weightbearing: WBAT  Range of Motion: No range of motion restrictions  Pain Medications: We reviewed post-operative pain medications at today's visit. The patient has stopped all opioid pain medications and no further refills are required  Extension: We reviewed the importance of full knee extension and demonstrated the relevant exercises as appropriate  Crutches/Brace: Patient no longer requires the hinged knee brace or crutches.   Acitivity Restrictions:  Discussed that this is the dangerous time after ACL reconstruction  Reviewed activity restrictions at today's visit  Goal to progress strength and motion to allow straight line running at three months from the date of surgery    Follow up: 6 weeks with no new radiographs needed            Again, thank you for allowing me to participate in the  care of your patient.        Sincerely,        Sumit Garber MD

## 2023-07-17 NOTE — PROGRESS NOTES
Chief Complaint:   1. ACL tear, medial meniscus root tear    Procedures:  1. Examination under anesthesia left Knee  2. left Knee arthroscopy  3. acl reconstruction bone tendon bone autograft  4. Transosseous repair of medial meniscus root  DOS 5/26/23    History:  Doing well, pain controlled, doing PT, off pain meds       Exam:     General: Awake, Alert, and oriented. Articulates and communicates with a normal affect     Left Lower Extremity:    Incisions well healed without evidence of infection, no erythema, drainage, or wound dehiscence     No post-operative effusion or ecchymosis    Range of motion 0-90+ degrees    stability exam not performed    TA/Gsc/EHL/FHL with 5/5 strength    Distal pulses palpable, toes are warm and well perfused       Imaging:  Ap and lateral radiographs show tunnels and hardware in good position.     Medications:   None     Assessment:  6 weeks following btb acl    Plan:     Weightbearing: WBAT    Range of Motion: No range of motion restrictions    Pain Medications: We reviewed post-operative pain medications at today's visit. The patient has stopped all opioid pain medications and no further refills are required    Extension: We reviewed the importance of full knee extension and demonstrated the relevant exercises as appropriate    Crutches/Brace: Patient no longer requires the hinged knee brace or crutches.     Acitivity Restrictions:    Discussed that this is the dangerous time after ACL reconstruction    Reviewed activity restrictions at today's visit    Goal to progress strength and motion to allow straight line running at three months from the date of surgery    Follow up: 6 weeks with no new radiographs needed

## 2023-07-17 NOTE — LETTER
July 17, 2023      Sudhir Mueller  [address] [facility] [address]        To Whom It May Concern:    Sudhir Mueller was seen in our clinic. He should be excused from work until August 28, 2023. At that time his work restrictions will be reassessed. Please contact my office with any questions or concerns.       Sincerely,        Sumit Garber MD

## 2023-07-17 NOTE — NURSING NOTE
"Reason For Visit:   Chief Complaint   Patient presents with     RECHECK     DOS: 5/26/23 left knee arthroscopy, ACL reconstruction BTB autograft, meniscus root repair     Date of surgery: 5/26/23  Type of surgery:   PROCEDURE:  1. Examination under anesthesia left Knee  2. left Knee arthroscopy  3. acl reconstruction bone tendon bone autograft  4.  Transosseous repair of medial meniscus root    Overall doing well, got off crutches one week ago. Has occasional discomfort in the knee.     SANE Score  Left Knee: 60  Right Knee: 100    Pain Assessment  Patient Currently in Pain: Yes  0-10 Pain Scale: 1  Primary Pain Location: Knee    Ht 1.549 m (5' 0.98\")   Wt 101.6 kg (224 lb)   BMI 42.35 kg/m           Allergies   Allergen Reactions     No Known Allergies        Current Outpatient Medications   Medication     acetaminophen (TYLENOL) 325 MG tablet     blood glucose (NO BRAND SPECIFIED) lancets standard     blood glucose (NO BRAND SPECIFIED) test strip     blood glucose calibration (ONETOUCH VERIO) solution     blood glucose monitoring (NO BRAND SPECIFIED) meter device kit     Blood Pressure Monitoring (ADULT BLOOD PRESSURE CUFF LG) KIT     exenatide ER (BYDUREON BCISE) 2 MG/0.85ML auto-injector     lisinopril (ZESTRIL) 10 MG tablet     metFORMIN (GLUCOPHAGE) 1000 MG tablet     STATIN NOT PRESCRIBED (INTENTIONAL)     aspirin 81 MG EC tablet     ASPIRIN NOT PRESCRIBED (INTENTIONAL)     ondansetron (ZOFRAN ODT) 4 MG ODT tab     oxyCODONE (ROXICODONE) 5 MG tablet     senna-docusate (SENOKOT-S/PERICOLACE) 8.6-50 MG tablet     No current facility-administered medications for this visit.         Maranda Peña, ATC    "

## 2023-07-20 ENCOUNTER — THERAPY VISIT (OUTPATIENT)
Dept: PHYSICAL THERAPY | Facility: REHABILITATION | Age: 28
End: 2023-07-20
Payer: COMMERCIAL

## 2023-07-20 DIAGNOSIS — M25.362 KNEE INSTABILITY, LEFT: ICD-10-CM

## 2023-07-20 DIAGNOSIS — S83.512A RUPTURE OF ANTERIOR CRUCIATE LIGAMENT OF LEFT KNEE, INITIAL ENCOUNTER: Primary | ICD-10-CM

## 2023-07-20 PROCEDURE — 97530 THERAPEUTIC ACTIVITIES: CPT | Mod: GP | Performed by: PHYSICAL THERAPIST

## 2023-07-20 PROCEDURE — 97110 THERAPEUTIC EXERCISES: CPT | Mod: GP | Performed by: PHYSICAL THERAPIST

## 2023-07-20 PROCEDURE — 97116 GAIT TRAINING THERAPY: CPT | Mod: GP | Performed by: PHYSICAL THERAPIST

## 2023-08-04 ENCOUNTER — THERAPY VISIT (OUTPATIENT)
Dept: PHYSICAL THERAPY | Facility: REHABILITATION | Age: 28
End: 2023-08-04
Payer: COMMERCIAL

## 2023-08-04 DIAGNOSIS — S83.512A RUPTURE OF ANTERIOR CRUCIATE LIGAMENT OF LEFT KNEE, INITIAL ENCOUNTER: Primary | ICD-10-CM

## 2023-08-04 DIAGNOSIS — M25.362 KNEE INSTABILITY, LEFT: ICD-10-CM

## 2023-08-04 PROCEDURE — 97110 THERAPEUTIC EXERCISES: CPT | Mod: 59 | Performed by: PHYSICAL THERAPIST

## 2023-08-04 PROCEDURE — 97530 THERAPEUTIC ACTIVITIES: CPT | Mod: GP | Performed by: PHYSICAL THERAPIST

## 2023-08-06 ENCOUNTER — HEALTH MAINTENANCE LETTER (OUTPATIENT)
Age: 28
End: 2023-08-06

## 2023-08-09 ENCOUNTER — TELEPHONE (OUTPATIENT)
Dept: ORTHOPEDICS | Facility: CLINIC | Age: 28
End: 2023-08-09
Payer: COMMERCIAL

## 2023-08-09 NOTE — TELEPHONE ENCOUNTER
[facility]    Phone Message    May a detailed message be left on voicemail: yes     Reason for Call: Pt's short term disability needs more detailed information of office visits and the reason for extension, please contact [employer] short term disability at  [phone] claim# [id number] , also has questions about records release form can call patient regarding this    Action Taken: Other: [facility]    Travel Screening: Not Applicable

## 2023-08-10 NOTE — TELEPHONE ENCOUNTER
Called [employer] to discuss patients paperwork, could not reach a person. Will reach out to patient instead.

## 2023-08-10 NOTE — TELEPHONE ENCOUNTER
Called patient, confirmed they need medical records. Transferred them to the Medical Records Team at P: [phone].

## 2023-08-24 ENCOUNTER — TELEPHONE (OUTPATIENT)
Dept: PHYSICAL THERAPY | Facility: REHABILITATION | Age: 28
End: 2023-08-24
Payer: COMMERCIAL

## 2023-08-24 NOTE — TELEPHONE ENCOUNTER
Returning a patient phone call. Talked to him about his rehab and continued HEP. He noted that he feels like he still needs to work on building up his strength to return to his PLOF, but he is unable to continue PT due to starting school again. We discussed that we will discharge him at this time and he can continue when he has more time as needed.

## 2023-08-24 NOTE — PROGRESS NOTES
DISCHARGE  Reason for Discharge: Patient chooses to discontinue therapy.    Discharge Plan: Patient to continue home program.    Referring Provider:  Sumit Garber MD       08/04/23 0500   Appointment Info   Signing clinician's name / credentials Emiliano Wells, PT, DPT, CMTPT/DN   Total/Authorized Visits 12   Visits Used 7   Medical Diagnosis Rupture of anterior cruciate ligament of left knee, initial encounter   PT Tx Diagnosis Decreased activity tolerance, decreased weight bearing tolerance, altered gait pattern   Precautions/Limitations TTWB and ROM 0-90 degrees for 6 weeks   Quick Adds Certification   Progress Note/Certification   Start of Care Date 06/01/23   Onset of illness/injury or Date of Surgery 05/26/23   Therapy Frequency 1 x/week   Predicted Duration 90 days   Certification date from 06/01/23   Certification date to 08/30/23   Progress Note Due Date 08/30/23   GOALS   PT Goals 2;3;4   PT Goal 1   Goal Identifier HEP   Goal Description Sudhir will be independent in their HEP in order to facilitate return to prior level of function.   Goal Progress In progress   Target Date 08/30/23   PT Goal 2   Goal Identifier Gait pattern   Goal Description Sudhir will demonstrate improvement in his symptoms and function as measured by being able to walk without any AD and with a normal gait pattern.   Goal Progress Normal gait pattern   Target Date 08/30/23   Date Met 08/04/23   PT Goal 3   Goal Identifier Knee ROM   Goal Description Sudhir will increase knee ROM to >/= 0-130 without pain in order to improve the quality of their ADLs.   Goal Progress 0 to 133   Target Date 08/30/23   Date Met 07/20/23   PT Goal 4   Goal Identifier Knee strength   Goal Description Sudhir will demonstrate knee strength of >/=5/5 without pain in order to improve the ease of their ADLs.   Goal Progress 5/5   Target Date 08/30/23   Date Met 08/04/23   Subjective Report   Subjective Report His knee has been feeling pretty good  "lately. He can tell he still needs to work on it, but he is making progress. He tried some light jogging, but it was slightly painful when he would land. Other than that he feels like he is able to do just about everything he wants to do.   Treatment Interventions (PT)   Interventions Therapeutic Procedure/Exercise;Manual Therapy;Therapeutic Activity;Gait Training   Therapeutic Procedure/Exercise   Therapeutic Procedures: strength, endurance, ROM, flexibillity minutes (25092) 15   Therapeutic Procedures Ther Proc 2;Ther Proc 3;Ther Proc 4;Ther Proc 5;Ther Proc 6;Ther Proc 7;Ther Proc 8;Ther Proc 9   Ther Proc 1 Bike   Ther Proc 1 - Details x5'   Ther Proc 2 Bridges   Ther Proc 2 - Details Double leg: x10, Single leg: 2 x10 B   Ther Proc 4 Supine SLR   Ther Proc 4 - Details 2 x15, no extensor lag   Ther Proc 5 Standing HS curl   Ther Proc 6 S/L hip abd   Ther Proc 7 Slantboard gastroc/soleus stretch   Ther Proc 8 Eccentric heel raises, left   Therapeutic Activity   Therapeutic Activities: dynamic activities to improve functional performance minutes (17232) 23   Therapeutic Activities Ther Act 3;Ther Act 2;Ther Act 4;Ther Act 5   Ther Act 1 Eccentric squats   Ther Act 1 - Details 4\" 2 x10   Ther Act 2 Step ups   Ther Act 2 - Details 8\" step 2 x10 B   Ther Act 3 Swiss ball bridges   Ther Act 3 - Details x10   Gait Training   Gait Training Gait 2   Gait 1 Retro walk   Gait 1 - Details 2 x50'   Gait 2 Slow motion marching   Gait 2 - Details x50'   Education   Learner/Method Patient   Education Comments Scar mobs   Plan   Home program PTRx   Plan for next session Limit deep knee bending still. Progress functional strengthening.   Comments   Comments Sudhir is showing good improvement in his knee strength and function. He was reminded to not run until the three month sumeet, per his surgeon's restrictions, which will be on 8/28. He did well with the progressed strengthening exercises, reporting feeling moderate fatigue in his " legs, but no increase in pain. Sudhir will continue to benefit from PT in order to ensure he returns to his prior levels of activities safely and reduce risk of recurrence.   Total Session Time   Timed Code Treatment Minutes 38   Total Treatment Time (sum of timed and untimed services) 38

## 2023-08-28 ENCOUNTER — OFFICE VISIT (OUTPATIENT)
Dept: ORTHOPEDICS | Facility: CLINIC | Age: 28
End: 2023-08-28
Payer: COMMERCIAL

## 2023-08-28 VITALS — HEIGHT: 61 IN | WEIGHT: 224 LBS | BODY MASS INDEX: 42.29 KG/M2

## 2023-08-28 DIAGNOSIS — Z98.890 S/P ACL RECONSTRUCTION: Primary | ICD-10-CM

## 2023-08-28 PROCEDURE — 99212 OFFICE O/P EST SF 10 MIN: CPT | Performed by: ORTHOPAEDIC SURGERY

## 2023-08-28 NOTE — NURSING NOTE
"Reason For Visit:   Chief Complaint   Patient presents with    RECHECK     DOS: 5/26/23 left knee arthroscopy, ACL reconstruction BTB autograft     Date of surgery: 5/26/23  Type of surgery:   PROCEDURE:  Examination under anesthesia left Knee  left Knee arthroscopy  acl reconstruction bone tendon bone autograft  Transosseous repair of medial meniscus root    Overall doing well. Would like to discuss current exercise restrictions.     SANE Score  Left Knee: 70-80  Right Knee: 100    Pain Assessment  Patient Currently in Pain: Denies  Primary Pain Location: Knee    Ht 1.549 m (5' 0.98\")   Wt 101.6 kg (224 lb)   BMI 42.35 kg/m           Allergies   Allergen Reactions    No Known Allergies        Current Outpatient Medications   Medication    acetaminophen (TYLENOL) 325 MG tablet    blood glucose (NO BRAND SPECIFIED) lancets standard    blood glucose (NO BRAND SPECIFIED) test strip    blood glucose calibration (ONETOUCH VERIO) solution    blood glucose monitoring (NO BRAND SPECIFIED) meter device kit    Blood Pressure Monitoring (ADULT BLOOD PRESSURE CUFF LG) KIT    exenatide ER (BYDUREON BCISE) 2 MG/0.85ML auto-injector    lisinopril (ZESTRIL) 10 MG tablet    metFORMIN (GLUCOPHAGE) 1000 MG tablet    STATIN NOT PRESCRIBED (INTENTIONAL)    aspirin 81 MG EC tablet    ASPIRIN NOT PRESCRIBED (INTENTIONAL)    ondansetron (ZOFRAN ODT) 4 MG ODT tab    oxyCODONE (ROXICODONE) 5 MG tablet    senna-docusate (SENOKOT-S/PERICOLACE) 8.6-50 MG tablet     No current facility-administered medications for this visit.         Maranda Peña, ATC    "

## 2023-08-28 NOTE — LETTER
August 28, 2023      Sudhir Mueller  [address] [facility] [address]        To Whom It May Concern:      Sudhir Mueller is under my professional care following left knee surgery. At this time is able to return to work without restrictions. Please contact our office with any questions or concerns.         Sincerely,        Sumit Garber MD

## 2023-08-28 NOTE — LETTER
8/28/2023         RE: Sudhir Mueller  [address] [facility] [address]        Dear Colleague,    Thank you for referring your patient, Sudhir Mueller, to the [facility] [place]. Please see a copy of my visit note below.    Chief Complaint:   ACL tear, medial meniscus root tear    Procedures:  Examination under anesthesia left Knee  left Knee arthroscopy  acl reconstruction bone tendon bone autograft  Transosseous repair of medial meniscus root  DOS 5/26/23    History:  Doing well 12 weeks status post the above.  Pain control.  Off opioids.  Ready to return to work.      Exam:     General: Awake, Alert, and oriented. Articulates and communicates with a normal affect     Left Lower Extremity:  Incisions well healed without evidence of infection, no erythema, drainage, or wound dehiscence   No post-operative effusion or ecchymosis  Range of motion is full and equal to the contralateral side  stability exam shows Lachman 0.  No pivot shift  TA/Gsc/EHL/FHL with 5/5 strength  Distal pulses palpable, toes are warm and well perfused       Imaging:  No new imaging    Medications:   None     Assessment:  12 weeks following btb acl    Plan:   Weightbearing: WBAT  Range of Motion: No range of motion restrictions.   Acitivity Restrictions:  May do straight line running at this time  No running distance or pace restrictions  No cutting, pivoting, or start-stop running  Goal to build strength, endurance, and confidence to allow sports in 3 months time (6 months from the date of surgery)  Brace: Discussed knee bracing options for sports including neoprene knee sleeve ACL functional bracing.   Discussed post-ACL reconstruction therapy program  Follow up: 3 months no XRays with an ACL functional test as completed by physical therapy.              Again, thank you for allowing me to participate in the care of your patient.        Sincerely,        Sumit Garber MD

## 2023-08-28 NOTE — PROGRESS NOTES
Chief Complaint:   ACL tear, medial meniscus root tear    Procedures:  Examination under anesthesia left Knee  left Knee arthroscopy  acl reconstruction bone tendon bone autograft  Transosseous repair of medial meniscus root  DOS 5/26/23    History:  Doing well 12 weeks status post the above.  Pain control.  Off opioids.  Ready to return to work.      Exam:     General: Awake, Alert, and oriented. Articulates and communicates with a normal affect     Left Lower Extremity:  Incisions well healed without evidence of infection, no erythema, drainage, or wound dehiscence   No post-operative effusion or ecchymosis  Range of motion is full and equal to the contralateral side  stability exam shows Lachman 0.  No pivot shift  TA/Gsc/EHL/FHL with 5/5 strength  Distal pulses palpable, toes are warm and well perfused       Imaging:  No new imaging    Medications:   None     Assessment:  12 weeks following btb acl    Plan:   Weightbearing: WBAT  Range of Motion: No range of motion restrictions.   Acitivity Restrictions:  May do straight line running at this time  No running distance or pace restrictions  No cutting, pivoting, or start-stop running  Goal to build strength, endurance, and confidence to allow sports in 3 months time (6 months from the date of surgery)  Brace: Discussed knee bracing options for sports including neoprene knee sleeve ACL functional bracing.   Discussed post-ACL reconstruction therapy program  Follow up: 3 months no XRays with an ACL functional test as completed by physical therapy.

## 2023-10-06 ENCOUNTER — TELEPHONE (OUTPATIENT)
Dept: ORTHOPEDICS | Facility: CLINIC | Age: 28
End: 2023-10-06
Payer: COMMERCIAL

## 2023-12-24 ENCOUNTER — HEALTH MAINTENANCE LETTER (OUTPATIENT)
Age: 28
End: 2023-12-24

## 2024-01-17 NOTE — PROGRESS NOTES
DISCHARGE  Reason for Discharge: Patient chooses to discontinue therapy.        Discharge Plan: Patient to continue home program.    Referring Provider:  Maritza Cruz      01/06/23 1300   Eval/Assessments   PT Eval, Moderate Complexity Minutes (98157) 15

## 2024-01-17 NOTE — ADDENDUM NOTE
Encounter addended by: Jayne Gastelum, PT on: 1/17/2024 3:26 PM   Actions taken: Clinical Note Signed, Flowsheet accepted

## 2024-03-03 ENCOUNTER — HEALTH MAINTENANCE LETTER (OUTPATIENT)
Age: 29
End: 2024-03-03

## 2024-04-28 ENCOUNTER — HOSPITAL ENCOUNTER (EMERGENCY)
Facility: CLINIC | Age: 29
Discharge: HOME | End: 2024-04-28
Attending: EMERGENCY MEDICINE | Admitting: EMERGENCY MEDICINE
Payer: COMMERCIAL

## 2024-04-28 VITALS
OXYGEN SATURATION: 100 % | HEIGHT: 63 IN | WEIGHT: 232.59 LBS | HEART RATE: 85 BPM | DIASTOLIC BLOOD PRESSURE: 90 MMHG | BODY MASS INDEX: 41.21 KG/M2 | TEMPERATURE: 98.2 F | SYSTOLIC BLOOD PRESSURE: 159 MMHG | RESPIRATION RATE: 18 BRPM

## 2024-04-28 DIAGNOSIS — T59.91XA INHALATION OF GASEOUS SUBSTANCE, ACCIDENTAL OR UNINTENTIONAL, INITIAL ENCOUNTER: ICD-10-CM

## 2024-04-28 LAB — COHGB MFR BLD: 0.8 % (ref 0–2)

## 2024-04-28 PROCEDURE — 36415 COLL VENOUS BLD VENIPUNCTURE: CPT | Performed by: EMERGENCY MEDICINE

## 2024-04-28 PROCEDURE — 82375 ASSAY CARBOXYHB QUANT: CPT | Performed by: EMERGENCY MEDICINE

## 2024-04-28 PROCEDURE — 99283 EMERGENCY DEPT VISIT LOW MDM: CPT

## 2024-04-28 PROCEDURE — 250N000013 HC RX MED GY IP 250 OP 250 PS 637: Performed by: EMERGENCY MEDICINE

## 2024-04-28 RX ORDER — IBUPROFEN 600 MG/1
600 TABLET, FILM COATED ORAL ONCE
Status: COMPLETED | OUTPATIENT
Start: 2024-04-28 | End: 2024-04-28

## 2024-04-28 RX ADMIN — IBUPROFEN 600 MG: 600 TABLET, FILM COATED ORAL at 15:30

## 2024-04-28 ASSESSMENT — COLUMBIA-SUICIDE SEVERITY RATING SCALE - C-SSRS
2. HAVE YOU ACTUALLY HAD ANY THOUGHTS OF KILLING YOURSELF IN THE PAST MONTH?: NO
1. IN THE PAST MONTH, HAVE YOU WISHED YOU WERE DEAD OR WISHED YOU COULD GO TO SLEEP AND NOT WAKE UP?: NO
6. HAVE YOU EVER DONE ANYTHING, STARTED TO DO ANYTHING, OR PREPARED TO DO ANYTHING TO END YOUR LIFE?: NO

## 2024-04-28 ASSESSMENT — ACTIVITIES OF DAILY LIVING (ADL): ADLS_ACUITY_SCORE: 33

## 2024-04-28 NOTE — ED TRIAGE NOTES
Pt states that he was at work at [employer] when he noted a gas smell.  He states that there were several episodes of gas smells that would last a few seconds at a time.  He is unable to describe the smell other than just a gas smell.  He states that he started having a headache and nausea and some SOB but he thinks this may be some anxiety.  He comes in for assessment of gas exposure.       Triage Assessment (Adult)       Row Name 04/28/24 1509          Triage Assessment    Airway WDL WDL        Respiratory WDL    Respiratory WDL WDL        Peripheral/Neurovascular WDL    Peripheral Neurovascular WDL WDL

## 2024-04-28 NOTE — ED PROVIDER NOTES
"  History     Chief Complaint:  Toxic Inhalation       The history is provided by the patient.      Sudhir Mueller is a 28 year old male with history of type 2 diabetes mellitus, obesity, NAFLD, and dyslipidemia who presents to the ED from work for evaluation of gas exposure. The patient reports potential toxic gas exposure today while at [employer]. He states that the furnace is usually on a cycle but he and his colleagues started to smell a \"strong gas smell. He was unable to identify which gas in particular. Reports a possible \"furnace issue\" and several episodes of the \"gas smell\" throughout his shift. Patient reports onset of a severe migraine and minor shortness of breath which are both better since arrival at the ED. He rates the headache at a 3 out of 10 in severity and he did not take any medications for pain relief. Denies cough, medical issues, or regular medications. On recheck, his boss reports that \"the gas starts with an a.\" Patient states his shift started this morning at 0600 and he discussed the recurring gas smell with coworkers around 0800. He told his boss around 1300.      Independent Historian:   None - Patient Only    Review of External Notes:   none       Medications:    Aspirin 81 mg  Lisinopril   Metformin     Past Medical History:    Type 2 diabetes mellitus  Obesity  NAFLD  Dyslipidemia   Obstructive sleep apnea   Transaminitis  Dyslipidemia   Varicella     Past Surgical History:    Arthroscopic reconstruction ACL bone tendon bone autograft   Meniscus root repair surgery      Physical Exam   Patient Vitals for the past 24 hrs:   BP Temp Temp src Pulse Resp SpO2 Height Weight   04/28/24 1509 (!) 159/90 98.2  F (36.8  C) Temporal 85 18 100 % 1.6 m (5' 3\") 105.5 kg (232 lb 9.4 oz)        Physical Exam  Constitutional:       Appearance: He is well-developed.   HENT:      Right Ear: External ear normal.      Left Ear: External ear normal.      Mouth/Throat:      Mouth: Mucous membranes are " moist.      Pharynx: Oropharynx is clear. No oropharyngeal exudate.   Eyes:      General: No scleral icterus.     Extraocular Movements: Extraocular movements intact.      Conjunctiva/sclera: Conjunctivae normal.      Pupils: Pupils are equal, round, and reactive to light.   Cardiovascular:      Rate and Rhythm: Normal rate and regular rhythm.      Heart sounds: Normal heart sounds. No murmur heard.     No friction rub. No gallop.   Pulmonary:      Effort: Pulmonary effort is normal. No respiratory distress.      Breath sounds: Normal breath sounds. No wheezing or rales.   Abdominal:      General: Bowel sounds are normal. There is no distension.      Palpations: Abdomen is soft. There is no mass.      Tenderness: There is no abdominal tenderness.   Musculoskeletal:      Cervical back: No rigidity or tenderness.   Skin:     General: Skin is warm and dry.      Capillary Refill: Capillary refill takes less than 2 seconds.      Findings: No rash.   Neurological:      General: No focal deficit present.      Mental Status: He is alert.      Cranial Nerves: No cranial nerve deficit.      Motor: No weakness.      Gait: Gait normal.             Emergency Department Course     Laboratory:  Labs Ordered and Resulted from Time of ED Arrival to Time of ED Departure   CARBON MONOXIDE - Normal       Result Value    Carbon Monoxide 0.8          Procedures   None     Emergency Department Course & Assessments:    Interventions:  Medications   ibuprofen (ADVIL/MOTRIN) tablet 600 mg (600 mg Oral $Given 4/28/24 1530)        Assessments:  1521 I obtained patient history and performed a physical exam.     Independent Interpretation (X-rays, CTs, rhythm strip):  None    Consultations/Discussion of Management or Tests:    ED Course as of 04/28/24 1640   Sun Apr 28, 2024   1521 I obtained patient history and performed a physical exam.    1550 I spoke with [employer], regarding the patient's history  and presentation in the emergency department today. She states they cleared the area of a gas leak at 1030 this morning as no gas leak could be detected.    1606 I rechecked the patient and explained findings.        Social Determinants of Health affecting care:   None    Disposition:  The patient was discharged.     Impression & Plan         Medical Decision Making:  Patient presents today for evaluation of exposure at work to some unknown gas.  Patient symptoms are a lot better now that he has been sitting in the ER.  He is not sure what the name of the gases.  I did speak with an occupational health nurse from his work.  She told me that they did check for gas leak around 1030 and cannot detect any.  They cleared the area for any gas leak at that point.  She did tell me that she sent the patient to ER because of his complaints and wanted to check his oxygen saturation.  Patient's vitals remained stable here symptoms are resolved.  He is reassured.  We did do a carbon monoxide level given his initial concern about the furnace being malfunctioning.  That was within normal range.  Patient is reassured.      Diagnosis:    ICD-10-CM    1. Inhalation of gaseous substance, accidental or unintentional, initial encounter  T59.91XA            Discharge Medications:  Discharge Medication List as of 4/28/2024  4:09 PM             Scribe Disclosure:  I, Sakina Marques, am serving as a scribe at 3:50 PM on 4/28/2024 to document services personally performed by Napoleon Montero MD based on my observations and the provider's statements to me.   4/28/2024   Napoleon Montero MD Cheng, Wenlan, MD  04/28/24 9682

## 2024-04-30 ENCOUNTER — PATIENT OUTREACH (OUTPATIENT)
Dept: CARE COORDINATION | Facility: CLINIC | Age: 29
End: 2024-04-30
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
## 2024-05-01 ENCOUNTER — PATIENT OUTREACH (OUTPATIENT)
Dept: CARE COORDINATION | Facility: CLINIC | Age: 29
End: 2024-05-01
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
## 2024-05-11 ENCOUNTER — HEALTH MAINTENANCE LETTER (OUTPATIENT)
Age: 29
End: 2024-05-11

## 2024-09-28 ENCOUNTER — HEALTH MAINTENANCE LETTER (OUTPATIENT)
Age: 29
End: 2024-09-28

## 2025-01-18 ENCOUNTER — HEALTH MAINTENANCE LETTER (OUTPATIENT)
Age: 30
End: 2025-01-18

## 2025-03-15 ENCOUNTER — HEALTH MAINTENANCE LETTER (OUTPATIENT)
Age: 30
End: 2025-03-15

## 2025-04-27 ENCOUNTER — HEALTH MAINTENANCE LETTER (OUTPATIENT)
Age: 30
End: 2025-04-27

## 2025-06-24 NOTE — PROGRESS NOTES
"  {PROVIDER CHARTING PREFERENCE:130866}    Antonio Peguero is a 29 year old, presenting for the following health issues:  check on health , discuss medications, and concerned skin yellow( arms)    HPI      Here for \"blood work\"  Has a hx of HTN, NAFLD, T2DM, knee pain  Would like to recheck again today    Current medications include   Diabetes Medication(s)       Biguanides       metFORMIN (GLUCOPHAGE) 1000 MG tablet Take 1 tablet (1,000 mg) by mouth 2 times daily (with meals)       Incretin Mimetic Agents       exenatide ER (BYDUREON BCISE) 2 MG/0.85ML auto-injector Inject 2 mg Subcutaneous every 7 days          Last A1c -   Lab Results   Component Value Date    A1C 5.5 03/17/2023    A1C 7.5 12/15/2022    A1C 8.0 01/10/2022    A1C 11.7 09/23/2021   Not checking BG yearly  Has been off medications for about a year to 18 months now  Was having some issues with constipation and abdominal pain  No current issues today  No symptoms of numbness or tingling of hands and feet  Going to gym 3-4 times a week, doing a lot of cardio  Ozempic previously not covered, would be ok    Hx of HTN  BP Readings from Last 6 Encounters:   06/25/25 125/80   04/28/24 (!) 159/90   05/26/23 (!) 140/74   05/01/23 109/76   02/21/23 128/83   12/15/22 132/85     Blood pressure control meds -   - lisinopril 10mg - stopped on his own  Feeling great      {ROS Picklists (Optional):529650}      Objective    /80   Pulse 67   Temp 97.1  F (36.2  C) (Tympanic)   Resp 20   Ht 1.6 m (5' 3\")   Wt 109.3 kg (241 lb)   SpO2 98%   BMI 42.69 kg/m    Body mass index is 42.69 kg/m .  Physical Exam   {Exam List (Optional):929935}    {Diagnostic Test Results (Optional):258684}        Signed Electronically by: Janeth Scott MD  Precepted patient with Dr. Arnaldo Alonzo.    " "management plan: Discussed healthy diet and exercise guidelines , referred to diabetes education, started on Ozempic      The longitudinal plan of care for the diagnosis(es)/condition(s) as documented were addressed during this visit. Due to the added complexity in care, I will continue to support Sudhir in the subsequent management and with ongoing continuity of care.    Follow-up in 3 months for recheck    Subjective   Sudhir is a 29 year old, presenting for the following health issues:  check on health , discuss medications, and concerned skin yellow( arms)    HPI      Here for \"blood work\"  Has a hx of HTN, NAFLD, T2DM, knee pain  Would like to recheck again today    Current medications include   Diabetes Medication(s)       Biguanides       metFORMIN (GLUCOPHAGE) 1000 MG tablet Take 1 tablet (1,000 mg) by mouth 2 times daily (with meals)       Incretin Mimetic Agents       exenatide ER (BYDUREON BCISE) 2 MG/0.85ML auto-injector Inject 2 mg Subcutaneous every 7 days          Last A1c -   Lab Results   Component Value Date    A1C 5.5 03/17/2023    A1C 7.5 12/15/2022    A1C 8.0 01/10/2022    A1C 11.7 09/23/2021   Not checking BG yearly  Has been off medications for about a year to 18 months now  Was having some issues with constipation and abdominal pain  No current issues today  No symptoms of numbness or tingling of hands and feet  Going to gym 3-4 times a week, doing a lot of cardio  Ozempic previously not covered, would be ok    Hx of HTN  BP Readings from Last 6 Encounters:   06/25/25 125/80   04/28/24 (!) 159/90   05/26/23 (!) 140/74   05/01/23 109/76   02/21/23 128/83   12/15/22 132/85     Blood pressure control meds -   - lisinopril 10mg - stopped on his own  Feeling great, no concerns        Review of Systems  Constitutional, neuro, ENT, endocrine, pulmonary, cardiac, gastrointestinal, genitourinary, musculoskeletal, integument and psychiatric systems are negative, except as otherwise noted.      Objective  " "  /80   Pulse 67   Temp 97.1  F (36.2  C) (Tympanic)   Resp 20   Ht 1.6 m (5' 3\")   Wt 109.3 kg (241 lb)   SpO2 98%   BMI 42.69 kg/m    Body mass index is 42.69 kg/m .    Physical Exam   General: Alert, no acute distress  Skin: clean, dry, and intact  HEENT: normocephalic, atraumatic, spontaneous eye movement, no injection or icterus, ears and nose normal, no neck masses  Resp: normal work of breathing, no wheezing  Cardio: RRR, S1 and S2 present  Abdomen: nondistended, no masses  Extremities: no erythema, no edema  Neuro: CN II-XII grossly intact, gait normal  Psych: normal mood, normal affect  Diabetic foot exam: normal DP and PT pulses, no trophic changes or ulcerative lesions, and normal sensory exam            Signed Electronically by: Janeth Scott MD  Precepted patient with Dr. Aranldo Alonzo.    "

## 2025-06-25 ENCOUNTER — OFFICE VISIT (OUTPATIENT)
Dept: FAMILY MEDICINE | Facility: CLINIC | Age: 30
End: 2025-06-25
Payer: COMMERCIAL

## 2025-06-25 ENCOUNTER — RESULTS FOLLOW-UP (OUTPATIENT)
Dept: FAMILY MEDICINE | Facility: CLINIC | Age: 30
End: 2025-06-25

## 2025-06-25 VITALS
HEART RATE: 67 BPM | OXYGEN SATURATION: 98 % | WEIGHT: 241 LBS | RESPIRATION RATE: 20 BRPM | SYSTOLIC BLOOD PRESSURE: 125 MMHG | TEMPERATURE: 97.1 F | DIASTOLIC BLOOD PRESSURE: 80 MMHG | HEIGHT: 63 IN | BODY MASS INDEX: 42.7 KG/M2

## 2025-06-25 DIAGNOSIS — E11.9 TYPE 2 DIABETES MELLITUS WITHOUT COMPLICATION, WITHOUT LONG-TERM CURRENT USE OF INSULIN (H): Primary | Chronic | ICD-10-CM

## 2025-06-25 DIAGNOSIS — K76.0 NAFLD (NONALCOHOLIC FATTY LIVER DISEASE): Chronic | ICD-10-CM

## 2025-06-25 DIAGNOSIS — E78.5 DYSLIPIDEMIA ASSOCIATED WITH TYPE 2 DIABETES MELLITUS (H): ICD-10-CM

## 2025-06-25 DIAGNOSIS — E11.69 DYSLIPIDEMIA ASSOCIATED WITH TYPE 2 DIABETES MELLITUS (H): ICD-10-CM

## 2025-06-25 PROBLEM — R03.0 ELEVATED BLOOD PRESSURE READING WITHOUT DIAGNOSIS OF HYPERTENSION: Status: RESOLVED | Noted: 2021-11-10 | Resolved: 2025-06-25

## 2025-06-25 LAB
ALBUMIN SERPL BCG-MCNC: 4.4 G/DL (ref 3.5–5.2)
ALP SERPL-CCNC: 67 U/L (ref 40–150)
ALT SERPL W P-5'-P-CCNC: 32 U/L (ref 0–70)
ANION GAP SERPL CALCULATED.3IONS-SCNC: 10 MMOL/L (ref 7–15)
AST SERPL W P-5'-P-CCNC: 24 U/L (ref 0–45)
BILIRUB SERPL-MCNC: 0.3 MG/DL
BILIRUBIN DIRECT (ROCHE PRO & PURE): 0.1 MG/DL (ref 0–0.45)
BUN SERPL-MCNC: 11.8 MG/DL (ref 6–20)
CALCIUM SERPL-MCNC: 9.4 MG/DL (ref 8.8–10.4)
CHLORIDE SERPL-SCNC: 105 MMOL/L (ref 98–107)
CHOLEST SERPL-MCNC: 252 MG/DL
CREAT SERPL-MCNC: 0.61 MG/DL (ref 0.67–1.17)
CREAT UR-MCNC: 172 MG/DL
EGFRCR SERPLBLD CKD-EPI 2021: >90 ML/MIN/1.73M2
EST. AVERAGE GLUCOSE BLD GHB EST-MCNC: 123 MG/DL
FASTING STATUS PATIENT QL REPORTED: ABNORMAL
FASTING STATUS PATIENT QL REPORTED: ABNORMAL
GLUCOSE SERPL-MCNC: 117 MG/DL (ref 70–99)
HBA1C MFR BLD: 5.9 % (ref 0–5.6)
HCO3 SERPL-SCNC: 24 MMOL/L (ref 22–29)
HDLC SERPL-MCNC: 38 MG/DL
LDLC SERPL CALC-MCNC: 166 MG/DL
MICROALBUMIN UR-MCNC: 42.2 MG/L
MICROALBUMIN/CREAT UR: 24.53 MG/G CR (ref 0–17)
NONHDLC SERPL-MCNC: 214 MG/DL
POTASSIUM SERPL-SCNC: 4.5 MMOL/L (ref 3.4–5.3)
PROT SERPL-MCNC: 7.6 G/DL (ref 6.4–8.3)
SODIUM SERPL-SCNC: 139 MMOL/L (ref 135–145)
TRIGL SERPL-MCNC: 241 MG/DL

## 2025-06-25 PROCEDURE — 3044F HG A1C LEVEL LT 7.0%: CPT

## 2025-06-25 PROCEDURE — 3074F SYST BP LT 130 MM HG: CPT

## 2025-06-25 PROCEDURE — 82570 ASSAY OF URINE CREATININE: CPT

## 2025-06-25 PROCEDURE — 99207 PR FOOT EXAM NO CHARGE: CPT

## 2025-06-25 PROCEDURE — 83036 HEMOGLOBIN GLYCOSYLATED A1C: CPT

## 2025-06-25 PROCEDURE — 99214 OFFICE O/P EST MOD 30 MIN: CPT | Mod: GC

## 2025-06-25 PROCEDURE — 80061 LIPID PANEL: CPT

## 2025-06-25 PROCEDURE — 82043 UR ALBUMIN QUANTITATIVE: CPT

## 2025-06-25 PROCEDURE — 3079F DIAST BP 80-89 MM HG: CPT

## 2025-06-25 PROCEDURE — G2211 COMPLEX E/M VISIT ADD ON: HCPCS

## 2025-06-25 PROCEDURE — 80053 COMPREHEN METABOLIC PANEL: CPT

## 2025-06-25 PROCEDURE — 82248 BILIRUBIN DIRECT: CPT

## 2025-06-25 PROCEDURE — 3050F LDL-C >= 130 MG/DL: CPT

## 2025-06-25 PROCEDURE — 36415 COLL VENOUS BLD VENIPUNCTURE: CPT

## 2025-06-26 ENCOUNTER — PATIENT OUTREACH (OUTPATIENT)
Dept: CARE COORDINATION | Facility: CLINIC | Age: 30
End: 2025-06-26
Payer: COMMERCIAL

## 2025-06-30 ENCOUNTER — PATIENT OUTREACH (OUTPATIENT)
Dept: CARE COORDINATION | Facility: CLINIC | Age: 30
End: 2025-06-30
Payer: COMMERCIAL

## 2025-06-30 NOTE — PROGRESS NOTES
Preceptor Attestation:  Patient's case reviewed and discussed with Janeth Scott MD resident and I evaluated the patient. I agree with written assessment and plan of care.  Supervising Physician:  Arnaldo Alonzo MD, MD [facility]

## 2025-07-01 ENCOUNTER — TELEPHONE (OUTPATIENT)
Dept: FAMILY MEDICINE | Facility: CLINIC | Age: 30
End: 2025-07-01
Payer: COMMERCIAL

## 2025-07-01 NOTE — TELEPHONE ENCOUNTER
Prior Authorization Retail Medication Request    Medication/Dose: Ozempic 8mg/3ml  Diagnosis and ICD code (if different than what is on RX):    New/renewal/insurance change PA/secondary ins. PA:  Previously Tried and Failed:    Rationale:      Insurance   Primary: CMM: [id number]      Clinic Information  Preferred routing pool for dept communication: EVIE

## 2025-07-02 NOTE — TELEPHONE ENCOUNTER
PA Initiation    Medication: OZEMPIC (2 MG/DOSE) 8 MG/3ML SC SOPN  Insurance Company: [employer] - Phone [phone] Fax [phone]  Pharmacy Filling the Rx: [id number] - [place], [place] - [facility] [address] AT [facility] [address]  Filling Pharmacy Phone: [phone]  Filling Pharmacy Fax: [phone]  Start Date: 7/2/2025

## 2025-07-02 NOTE — TELEPHONE ENCOUNTER
Prior Authorization Approval    Medication: OZEMPIC (2 MG/DOSE) 8 MG/3ML SC SOPN  Authorization Effective Date: 7/2/2025  Authorization Expiration Date: 7/1/2028  Reference #: [id number]   Insurance Company: [employer] - Phone [phone] Fax [phone]  Which Pharmacy is filling the prescription: [id number] - [place], [place] - [address] AT [place] [address]  Pharmacy Notified: YES  Patient Notified: YES

## 2025-07-29 ENCOUNTER — OFFICE VISIT (OUTPATIENT)
Dept: EDUCATION SERVICES | Facility: CLINIC | Age: 30
End: 2025-07-29
Attending: FAMILY MEDICINE
Payer: COMMERCIAL

## 2025-07-29 VITALS
DIASTOLIC BLOOD PRESSURE: 72 MMHG | HEART RATE: 72 BPM | OXYGEN SATURATION: 97 % | SYSTOLIC BLOOD PRESSURE: 111 MMHG | TEMPERATURE: 97.9 F | RESPIRATION RATE: 18 BRPM | BODY MASS INDEX: 44.3 KG/M2 | WEIGHT: 250 LBS | HEIGHT: 63 IN

## 2025-07-29 DIAGNOSIS — E11.9 TYPE 2 DIABETES MELLITUS WITHOUT COMPLICATION, WITHOUT LONG-TERM CURRENT USE OF INSULIN (H): Chronic | ICD-10-CM

## 2025-07-29 DIAGNOSIS — K76.0 NAFLD (NONALCOHOLIC FATTY LIVER DISEASE): Chronic | ICD-10-CM

## 2025-07-29 PROCEDURE — 99207 PR DROP WITH A PROCEDURE: CPT | Performed by: PHARMACIST

## 2025-07-29 PROCEDURE — G0108 DIAB MANAGE TRN  PER INDIV: HCPCS | Performed by: PHARMACIST

## 2025-07-29 PROCEDURE — 3078F DIAST BP <80 MM HG: CPT | Performed by: PHARMACIST

## 2025-07-29 PROCEDURE — 3074F SYST BP LT 130 MM HG: CPT | Performed by: PHARMACIST

## 2025-07-29 RX ORDER — SEMAGLUTIDE 0.68 MG/ML
0.5 INJECTION, SOLUTION SUBCUTANEOUS WEEKLY
Qty: 3 ML | Refills: 1 | Status: SHIPPED | OUTPATIENT
Start: 2025-08-26

## (undated) DEVICE — BUR ARTHREX COOLCUT SABRE 4.0MMX13CM AR-8400SR

## (undated) DEVICE — ABLATOR ARTHREX APOLLO RF MP90 ASPIRATING 90DEG AR-9811

## (undated) DEVICE — SU TIGERSTICK #2 TIGERWIRE 50" STIFF END 12" AR-7209T

## (undated) DEVICE — ESU GROUND PAD ADULT W/CORD E7507

## (undated) DEVICE — TUBING SYSTEM ARTHREX PATIENT REDEUCE AR-6421

## (undated) DEVICE — SU ETHIBOND 1 CT-1 30" X425H

## (undated) DEVICE — GLOVE BIOGEL PI MICRO INDICATOR UNDERGLOVE SZ 8.0 48980

## (undated) DEVICE — LINEN ORTHO PACK 5446

## (undated) DEVICE — PACK ACL SUPPLEMENT CUSTOM ASC

## (undated) DEVICE — SU VICRYL 2-0 CT-1 27" UND J259H

## (undated) DEVICE — CANNULA PASSPORT BUTTON 8X3CM AR-6592-08-30

## (undated) DEVICE — GLOVE BIOGEL PI MICRO SZ 6.0 48560

## (undated) DEVICE — PACK ARTHROSCOPY CUSTOM ASC

## (undated) DEVICE — PAD ARMBOARD FOAM EGGCRATE COVIDEN 3114367

## (undated) DEVICE — BNDG SPANDAGRIP SZ J LF BEIGE 6.75" SAG13117

## (undated) DEVICE — REAMER ARTHREX LOW PROFILE 10MM  AR-1410LP

## (undated) DEVICE — SU MONOCRYL 3-0 PS-1 27" Y936H

## (undated) DEVICE — Device

## (undated) DEVICE — DRAPE TIBURON TOP SHEET 100X60" 29352

## (undated) DEVICE — SUCTION MANIFOLD NEPTUNE 2 SYS 4 PORT 0702-020-000

## (undated) DEVICE — PIN GUIDE ARTHREX 2.4MM DRILL  AR-1250L

## (undated) DEVICE — PEN MARKING SKIN W/PAPER RULER 31145785

## (undated) DEVICE — PREP CHLORAPREP 26ML TINTED ORANGE  260815

## (undated) DEVICE — BLADE SAW OSCILLATING STRK MICRO 9X10X0.51MM 2296-033-220

## (undated) DEVICE — ESU PENCIL SMOKE EVAC W/ROCKER SWITCH 0703-047-000

## (undated) DEVICE — NDL ARTHREX SCORPION KNEE 2-0 AR-12990N

## (undated) DEVICE — GLOVE BIOGEL PI MICRO SZ 8.0 48580

## (undated) DEVICE — DRSG STERI STRIP 1/2X4" R1547

## (undated) DEVICE — SOL NACL 0.9% IRRIG 3000ML BAG 2B7477

## (undated) DEVICE — GLOVE BIOGEL PI MICRO INDICATOR UNDERGLOVE SZ 6.5 48965

## (undated) RX ORDER — CEFAZOLIN SODIUM 2 G/50ML
SOLUTION INTRAVENOUS
Status: DISPENSED
Start: 2023-05-26

## (undated) RX ORDER — HYDROMORPHONE HYDROCHLORIDE 1 MG/ML
INJECTION, SOLUTION INTRAMUSCULAR; INTRAVENOUS; SUBCUTANEOUS
Status: DISPENSED
Start: 2023-05-26

## (undated) RX ORDER — FENTANYL CITRATE 50 UG/ML
INJECTION, SOLUTION INTRAMUSCULAR; INTRAVENOUS
Status: DISPENSED
Start: 2023-05-26

## (undated) RX ORDER — PROPOFOL 10 MG/ML
INJECTION, EMULSION INTRAVENOUS
Status: DISPENSED
Start: 2023-05-26

## (undated) RX ORDER — DEXAMETHASONE SODIUM PHOSPHATE 4 MG/ML
INJECTION, SOLUTION INTRA-ARTICULAR; INTRALESIONAL; INTRAMUSCULAR; INTRAVENOUS; SOFT TISSUE
Status: DISPENSED
Start: 2023-05-26

## (undated) RX ORDER — VANCOMYCIN HYDROCHLORIDE 1 G/20ML
INJECTION, POWDER, LYOPHILIZED, FOR SOLUTION INTRAVENOUS
Status: DISPENSED
Start: 2023-05-26

## (undated) RX ORDER — ONDANSETRON 2 MG/ML
INJECTION INTRAMUSCULAR; INTRAVENOUS
Status: DISPENSED
Start: 2023-05-26

## (undated) RX ORDER — ACETAMINOPHEN 325 MG/1
TABLET ORAL
Status: DISPENSED
Start: 2023-05-26

## (undated) RX ORDER — EPINEPHRINE 1 MG/ML
INJECTION, SOLUTION INTRAMUSCULAR; SUBCUTANEOUS
Status: DISPENSED
Start: 2023-05-26

## (undated) RX ORDER — OXYCODONE HYDROCHLORIDE 5 MG/1
TABLET ORAL
Status: DISPENSED
Start: 2023-05-26

## (undated) RX ORDER — GLYCOPYRROLATE 0.2 MG/ML
INJECTION INTRAMUSCULAR; INTRAVENOUS
Status: DISPENSED
Start: 2023-05-26

## (undated) RX ORDER — BUPIVACAINE HYDROCHLORIDE 2.5 MG/ML
INJECTION, SOLUTION EPIDURAL; INFILTRATION; INTRACAUDAL
Status: DISPENSED
Start: 2023-05-26